# Patient Record
Sex: MALE | Race: WHITE | Employment: FULL TIME | URBAN - METROPOLITAN AREA
[De-identification: names, ages, dates, MRNs, and addresses within clinical notes are randomized per-mention and may not be internally consistent; named-entity substitution may affect disease eponyms.]

---

## 2017-07-05 DIAGNOSIS — E78.1 HYPERTRIGLYCERIDEMIA: ICD-10-CM

## 2017-07-05 RX ORDER — AMLODIPINE BESYLATE 5 MG/1
5 TABLET ORAL DAILY
Qty: 30 TABLET | Refills: 0 | Status: SHIPPED | OUTPATIENT
Start: 2017-07-05 | End: 2017-08-03

## 2017-07-05 RX ORDER — VALSARTAN 160 MG/1
TABLET ORAL
Qty: 30 TABLET | Refills: 0 | Status: SHIPPED | OUTPATIENT
Start: 2017-07-05 | End: 2017-08-03

## 2017-07-05 NOTE — TELEPHONE ENCOUNTER
Patient needs refill on the following medication. States this was not included in request from pharmacy. Current Outpatient Prescriptions:  Fenofibrate 160 MG Oral Tab Take 1 tablet (160 mg total) by mouth daily.  Disp: 30 tablet Rfl: 11     He is willi

## 2017-07-05 NOTE — TELEPHONE ENCOUNTER
LOV: 7/16/16 with Dr. Asim Lindsey: 7/8/16    Next scheduled appt:  No future apts scheduled at this time

## 2017-07-05 NOTE — TELEPHONE ENCOUNTER
Refills for 1 month sent.   Needs appointment with me for physical and labs before additional refills

## 2017-07-05 NOTE — TELEPHONE ENCOUNTER
JOSH - Kareen ortega checking status of refill. Current Outpatient Prescriptions: AmLODIPine Besylate 5 MG Oral Tab Take 1 tablet (5 mg total) by mouth daily. At bedtime Disp: 30 tablet Rfl: 11`   valsartan 160 MG Oral Tab Take one at bedtime.  Disp:

## 2017-07-06 RX ORDER — FENOFIBRATE 160 MG/1
160 TABLET ORAL DAILY
Qty: 30 TABLET | Refills: 0 | Status: SHIPPED | OUTPATIENT
Start: 2017-07-06 | End: 2017-08-03

## 2017-07-06 NOTE — TELEPHONE ENCOUNTER
Pended med for approval.  Pt was left a message to schedule an apt for further refills in the last communication

## 2017-07-17 ENCOUNTER — OFFICE VISIT (OUTPATIENT)
Dept: INTERNAL MEDICINE CLINIC | Facility: CLINIC | Age: 57
End: 2017-07-17

## 2017-07-17 VITALS
BODY MASS INDEX: 36.57 KG/M2 | SYSTOLIC BLOOD PRESSURE: 124 MMHG | HEIGHT: 72 IN | HEART RATE: 80 BPM | DIASTOLIC BLOOD PRESSURE: 72 MMHG | WEIGHT: 270 LBS

## 2017-07-17 DIAGNOSIS — E78.5 DYSLIPIDEMIA: ICD-10-CM

## 2017-07-17 DIAGNOSIS — I10 ESSENTIAL HYPERTENSION: ICD-10-CM

## 2017-07-17 DIAGNOSIS — Z00.00 ANNUAL PHYSICAL EXAM: Primary | ICD-10-CM

## 2017-07-17 DIAGNOSIS — G47.33 OBSTRUCTIVE SLEEP APNEA: ICD-10-CM

## 2017-07-17 PROBLEM — E66.9 OBESITY: Status: ACTIVE | Noted: 2017-07-17

## 2017-07-17 PROCEDURE — 90715 TDAP VACCINE 7 YRS/> IM: CPT | Performed by: INTERNAL MEDICINE

## 2017-07-17 PROCEDURE — 90471 IMMUNIZATION ADMIN: CPT | Performed by: INTERNAL MEDICINE

## 2017-07-17 PROCEDURE — 99396 PREV VISIT EST AGE 40-64: CPT | Performed by: INTERNAL MEDICINE

## 2017-07-17 NOTE — H&P
Sanjuanita Hernandez is a 49year old male who presents for a complete physical exam.   HPI:   Previous PCP was Dr. Mahogany Navarro, who retired last year. He feels well today. No specific issues for discussion.   Prior history of essential hypertension, obstructive Medical History:   Diagnosis Date   • Dyslipidemia    • Essential hypertension    • Obesity    • Obstructive sleep apnea       History reviewed. No pertinent surgical history.    Family History   Problem Relation Age of Onset   • T-cell Lymphoma [OTHER] Fat exam  Tdap vaccine today. Also recommend annual influenza vaccine. Check CMP CBC lipid profile and screening PSA when able. Order sent. Referred to GI for screening colonoscopy. Contact information provided. Continue current medications.   Recommend h

## 2017-07-18 ENCOUNTER — APPOINTMENT (OUTPATIENT)
Dept: LAB | Facility: HOSPITAL | Age: 57
End: 2017-07-18
Attending: INTERNAL MEDICINE
Payer: COMMERCIAL

## 2017-07-18 DIAGNOSIS — Z00.00 ANNUAL PHYSICAL EXAM: ICD-10-CM

## 2017-07-18 LAB
ALBUMIN SERPL BCP-MCNC: 4.7 G/DL (ref 3.5–4.8)
ALBUMIN/GLOB SERPL: 1.7 {RATIO} (ref 1–2)
ALP SERPL-CCNC: 51 U/L (ref 32–100)
ALT SERPL-CCNC: 30 U/L (ref 17–63)
ANION GAP SERPL CALC-SCNC: 12 MMOL/L (ref 0–18)
AST SERPL-CCNC: 27 U/L (ref 15–41)
BILIRUB SERPL-MCNC: 0.8 MG/DL (ref 0.3–1.2)
BUN SERPL-MCNC: 23 MG/DL (ref 8–20)
BUN/CREAT SERPL: 15.5 (ref 10–20)
CALCIUM SERPL-MCNC: 10 MG/DL (ref 8.5–10.5)
CHLORIDE SERPL-SCNC: 102 MMOL/L (ref 95–110)
CHOLEST SERPL-MCNC: 186 MG/DL (ref 110–200)
CO2 SERPL-SCNC: 25 MMOL/L (ref 22–32)
CREAT SERPL-MCNC: 1.48 MG/DL (ref 0.5–1.5)
ERYTHROCYTE [DISTWIDTH] IN BLOOD BY AUTOMATED COUNT: 14.4 % (ref 11–15)
GLOBULIN PLAS-MCNC: 2.8 G/DL (ref 2.5–3.7)
GLUCOSE SERPL-MCNC: 98 MG/DL (ref 70–99)
HCT VFR BLD AUTO: 39.2 % (ref 41–52)
HDLC SERPL-MCNC: 42 MG/DL
HGB BLD-MCNC: 13.1 G/DL (ref 13.5–17.5)
LDLC SERPL CALC-MCNC: 84 MG/DL (ref 0–99)
MCH RBC QN AUTO: 29.3 PG (ref 27–32)
MCHC RBC AUTO-ENTMCNC: 33.6 G/DL (ref 32–37)
MCV RBC AUTO: 87.5 FL (ref 80–100)
NONHDLC SERPL-MCNC: 144 MG/DL
OSMOLALITY UR CALC.SUM OF ELEC: 292 MOSM/KG (ref 275–295)
PLATELET # BLD AUTO: 253 K/UL (ref 140–400)
PMV BLD AUTO: 8.3 FL (ref 7.4–10.3)
POTASSIUM SERPL-SCNC: 4.1 MMOL/L (ref 3.3–5.1)
PROT SERPL-MCNC: 7.5 G/DL (ref 5.9–8.4)
PSA SERPL-MCNC: 0.4 NG/ML (ref 0–4)
RBC # BLD AUTO: 4.48 M/UL (ref 4.5–5.9)
SODIUM SERPL-SCNC: 139 MMOL/L (ref 136–144)
TRIGL SERPL-MCNC: 298 MG/DL (ref 1–149)
WBC # BLD AUTO: 8.2 K/UL (ref 4–11)

## 2017-07-18 PROCEDURE — 85027 COMPLETE CBC AUTOMATED: CPT

## 2017-07-18 PROCEDURE — 36415 COLL VENOUS BLD VENIPUNCTURE: CPT

## 2017-07-18 PROCEDURE — 80061 LIPID PANEL: CPT

## 2017-07-18 PROCEDURE — 80053 COMPREHEN METABOLIC PANEL: CPT

## 2017-07-19 ENCOUNTER — TELEPHONE (OUTPATIENT)
Dept: INTERNAL MEDICINE CLINIC | Facility: CLINIC | Age: 57
End: 2017-07-19

## 2017-07-19 DIAGNOSIS — E78.5 DYSLIPIDEMIA: ICD-10-CM

## 2017-07-19 DIAGNOSIS — R00.0 TACHYCARDIA: ICD-10-CM

## 2017-07-19 RX ORDER — METOPROLOL TARTRATE 50 MG/1
50 TABLET, FILM COATED ORAL NIGHTLY
Qty: 30 TABLET | Refills: 5 | Status: SHIPPED | OUTPATIENT
Start: 2017-07-19 | End: 2018-01-18

## 2017-07-19 RX ORDER — ATORVASTATIN CALCIUM 10 MG/1
10 TABLET, FILM COATED ORAL NIGHTLY
Qty: 30 TABLET | Refills: 5 | Status: SHIPPED | OUTPATIENT
Start: 2017-07-19 | End: 2018-01-18

## 2017-07-19 NOTE — TELEPHONE ENCOUNTER
[facility]     Current Outpatient Prescriptions:  Atorvastatin Calcium 10 MG Oral Tab Take 1 tablet (10 mg total) by mouth nightly. Disp: 30 tablet Rfl: 11   Metoprolol Tartrate 50 MG Oral Tab Take 1 tablet (50 mg total) by mouth nightly.  Disp: 30 tab

## 2017-08-03 DIAGNOSIS — E78.1 HYPERTRIGLYCERIDEMIA: ICD-10-CM

## 2017-08-03 RX ORDER — VALSARTAN 160 MG/1
TABLET ORAL
Qty: 30 TABLET | Refills: 5 | Status: SHIPPED | OUTPATIENT
Start: 2017-08-03 | End: 2018-01-30

## 2017-08-03 RX ORDER — FENOFIBRATE 160 MG/1
160 TABLET ORAL DAILY
Qty: 30 TABLET | Refills: 5 | Status: SHIPPED | OUTPATIENT
Start: 2017-08-03 | End: 2018-01-30

## 2017-08-03 RX ORDER — AMLODIPINE BESYLATE 5 MG/1
TABLET ORAL
Qty: 30 TABLET | Refills: 5 | Status: SHIPPED | OUTPATIENT
Start: 2017-08-03 | End: 2018-01-14

## 2017-12-19 DIAGNOSIS — E78.5 DYSLIPIDEMIA: ICD-10-CM

## 2017-12-19 DIAGNOSIS — R00.0 TACHYCARDIA: ICD-10-CM

## 2017-12-19 RX ORDER — ATORVASTATIN CALCIUM 10 MG/1
TABLET, FILM COATED ORAL
Qty: 30 TABLET | Refills: 5 | Status: CANCELLED | OUTPATIENT
Start: 2017-12-19

## 2018-01-15 RX ORDER — AMLODIPINE BESYLATE 5 MG/1
TABLET ORAL
Qty: 30 TABLET | Refills: 0 | Status: SHIPPED | OUTPATIENT
Start: 2018-01-15 | End: 2018-03-01

## 2018-01-18 DIAGNOSIS — E78.5 DYSLIPIDEMIA: ICD-10-CM

## 2018-01-18 DIAGNOSIS — R00.0 TACHYCARDIA: ICD-10-CM

## 2018-01-18 RX ORDER — METOPROLOL TARTRATE 50 MG/1
50 TABLET, FILM COATED ORAL NIGHTLY
Qty: 30 TABLET | Refills: 0 | Status: SHIPPED | OUTPATIENT
Start: 2018-01-18 | End: 2018-02-12

## 2018-01-18 RX ORDER — ATORVASTATIN CALCIUM 10 MG/1
10 TABLET, FILM COATED ORAL NIGHTLY
Qty: 90 TABLET | Refills: 0 | Status: SHIPPED | OUTPATIENT
Start: 2018-01-18 | End: 2018-03-28

## 2018-01-18 NOTE — TELEPHONE ENCOUNTER
Noted per 1/14/18 Dr Stephen Ann had stated pt needs appt and was called x1 but no call back. Called pt as unable to refill metoprolol per protocol since pt is overdue for f/u. Pt declines to schedule appt; states he will have to see when he can get in.  Informed <130   Recent Outpatient Visits            6 months ago Annual physical exam    Francisco Tai MD    Office Visit    1 year ago Hypersomnia    Alec, [facility]

## 2018-01-18 NOTE — TELEPHONE ENCOUNTER
Pt stts he has been out of medication for 4 days. atorvastatin 10 MG Oral Tab Take 1 tablet (10 mg total) by mouth nightly. Disp: 30 tablet Rfl: 5   Metoprolol Tartrate 50 MG Oral Tab Take 1 tablet (50 mg total) by mouth nightly.  Disp: 30 tablet Rfl: 5

## 2018-01-30 DIAGNOSIS — E78.1 HYPERTRIGLYCERIDEMIA: ICD-10-CM

## 2018-01-30 RX ORDER — VALSARTAN 160 MG/1
TABLET ORAL
Qty: 30 TABLET | Refills: 0 | Status: SHIPPED | OUTPATIENT
Start: 2018-01-30 | End: 2018-02-28

## 2018-01-30 RX ORDER — FENOFIBRATE 160 MG/1
160 TABLET ORAL DAILY
Qty: 30 TABLET | Refills: 0 | Status: SHIPPED | OUTPATIENT
Start: 2018-01-30 | End: 2018-02-28

## 2018-02-12 DIAGNOSIS — R00.0 TACHYCARDIA: ICD-10-CM

## 2018-02-12 RX ORDER — METOPROLOL TARTRATE 50 MG/1
50 TABLET, FILM COATED ORAL NIGHTLY
Qty: 30 TABLET | Refills: 0 | Status: SHIPPED | OUTPATIENT
Start: 2018-02-12 | End: 2018-03-16

## 2018-02-28 DIAGNOSIS — E78.1 HYPERTRIGLYCERIDEMIA: ICD-10-CM

## 2018-02-28 RX ORDER — FENOFIBRATE 160 MG/1
160 TABLET ORAL DAILY
Qty: 30 TABLET | Refills: 0 | Status: SHIPPED | OUTPATIENT
Start: 2018-02-28 | End: 2018-03-28

## 2018-02-28 RX ORDER — VALSARTAN 160 MG/1
TABLET ORAL
Qty: 30 TABLET | Refills: 0 | Status: SHIPPED | OUTPATIENT
Start: 2018-02-28 | End: 2018-03-27

## 2018-03-01 RX ORDER — AMLODIPINE BESYLATE 5 MG/1
TABLET ORAL
Qty: 30 TABLET | Refills: 0 | Status: SHIPPED | OUTPATIENT
Start: 2018-03-01 | End: 2018-03-28

## 2018-03-01 NOTE — TELEPHONE ENCOUNTER
Per pt he states he will call back to schedule an appt. He states last time he called someone told him he didn't need to come in. Not sure who but that's what pt said.

## 2018-03-16 DIAGNOSIS — R00.0 TACHYCARDIA: ICD-10-CM

## 2018-03-16 RX ORDER — METOPROLOL TARTRATE 50 MG/1
50 TABLET, FILM COATED ORAL NIGHTLY
Qty: 30 TABLET | Refills: 0 | Status: SHIPPED | OUTPATIENT
Start: 2018-03-16 | End: 2018-03-28

## 2018-03-27 RX ORDER — VALSARTAN 160 MG/1
TABLET ORAL
Qty: 30 TABLET | Refills: 0 | Status: SHIPPED | OUTPATIENT
Start: 2018-03-27 | End: 2018-03-28

## 2018-03-28 ENCOUNTER — OFFICE VISIT (OUTPATIENT)
Dept: INTERNAL MEDICINE CLINIC | Facility: CLINIC | Age: 58
End: 2018-03-28

## 2018-03-28 VITALS
HEIGHT: 72 IN | SYSTOLIC BLOOD PRESSURE: 134 MMHG | WEIGHT: 269 LBS | BODY MASS INDEX: 36.44 KG/M2 | DIASTOLIC BLOOD PRESSURE: 72 MMHG | HEART RATE: 86 BPM

## 2018-03-28 DIAGNOSIS — I10 ESSENTIAL HYPERTENSION: Primary | ICD-10-CM

## 2018-03-28 DIAGNOSIS — E78.5 DYSLIPIDEMIA: ICD-10-CM

## 2018-03-28 PROCEDURE — 99213 OFFICE O/P EST LOW 20 MIN: CPT | Performed by: INTERNAL MEDICINE

## 2018-03-28 PROCEDURE — 99212 OFFICE O/P EST SF 10 MIN: CPT | Performed by: INTERNAL MEDICINE

## 2018-03-28 RX ORDER — METOPROLOL TARTRATE 50 MG/1
50 TABLET, FILM COATED ORAL NIGHTLY
Qty: 30 TABLET | Refills: 5 | Status: SHIPPED | OUTPATIENT
Start: 2018-03-28 | End: 2018-10-02

## 2018-03-28 RX ORDER — VALSARTAN 160 MG/1
TABLET ORAL
Qty: 30 TABLET | Refills: 5 | Status: SHIPPED | OUTPATIENT
Start: 2018-03-28 | End: 2018-10-02

## 2018-03-28 RX ORDER — ATORVASTATIN CALCIUM 10 MG/1
10 TABLET, FILM COATED ORAL NIGHTLY
Qty: 30 TABLET | Refills: 5 | Status: SHIPPED | OUTPATIENT
Start: 2018-03-28 | End: 2018-10-29

## 2018-03-28 RX ORDER — AMLODIPINE BESYLATE 5 MG/1
TABLET ORAL
Qty: 30 TABLET | Refills: 5 | Status: SHIPPED | OUTPATIENT
Start: 2018-03-28 | End: 2018-10-02

## 2018-03-28 RX ORDER — FENOFIBRATE 160 MG/1
160 TABLET ORAL DAILY
Qty: 30 TABLET | Refills: 5 | Status: SHIPPED | OUTPATIENT
Start: 2018-03-28 | End: 2018-09-09

## 2018-03-28 NOTE — PATIENT INSTRUCTIONS
Continue current medications. Please obtain blood tests soon when you can. Schedule a physical in 4-6 months.

## 2018-03-28 NOTE — PROGRESS NOTES
Vanessa Kevin is a 56year old male. Patient presents with:  Hypertension: follow up ,refill medication    HPI:   Mr. Violette Cardozo presents this afternoon for follow-up of hypertension. Last visit here was for his physical July 2017.   Labs done at that time superficial subcutaneous mass left upper anterior chest wall without tenderness fluctuance or surrounding erythema, with minimal sebum expressible on palpation  CARDIAC: Rhythm regular S1 S2 normal without murmur or edema  ABDOMEN: Bowel sounds normal soft

## 2018-04-01 RX ORDER — AMLODIPINE BESYLATE 5 MG/1
TABLET ORAL
Qty: 30 TABLET | Refills: 5 | Status: SHIPPED | OUTPATIENT
Start: 2018-04-01 | End: 2018-10-29

## 2018-04-14 DIAGNOSIS — E78.5 DYSLIPIDEMIA: ICD-10-CM

## 2018-04-14 RX ORDER — ATORVASTATIN CALCIUM 10 MG/1
10 TABLET, FILM COATED ORAL NIGHTLY
Qty: 90 TABLET | Refills: 1 | Status: SHIPPED | OUTPATIENT
Start: 2018-04-14 | End: 2018-10-02

## 2018-04-16 ENCOUNTER — APPOINTMENT (OUTPATIENT)
Dept: LAB | Age: 58
End: 2018-04-16
Attending: INTERNAL MEDICINE
Payer: COMMERCIAL

## 2018-04-16 DIAGNOSIS — I10 ESSENTIAL HYPERTENSION: ICD-10-CM

## 2018-04-16 PROCEDURE — 36415 COLL VENOUS BLD VENIPUNCTURE: CPT

## 2018-04-16 PROCEDURE — 80048 BASIC METABOLIC PNL TOTAL CA: CPT

## 2018-09-09 RX ORDER — FENOFIBRATE 160 MG/1
160 TABLET ORAL DAILY
Qty: 30 TABLET | Refills: 1 | Status: SHIPPED | OUTPATIENT
Start: 2018-09-09 | End: 2018-12-03

## 2018-10-01 DIAGNOSIS — E78.5 DYSLIPIDEMIA: ICD-10-CM

## 2018-10-02 RX ORDER — AMLODIPINE BESYLATE 5 MG/1
TABLET ORAL
Qty: 90 TABLET | Refills: 0 | Status: SHIPPED | OUTPATIENT
Start: 2018-10-02 | End: 2019-04-17

## 2018-10-02 RX ORDER — ATORVASTATIN CALCIUM 10 MG/1
10 TABLET, FILM COATED ORAL NIGHTLY
Qty: 90 TABLET | Refills: 0 | Status: SHIPPED | OUTPATIENT
Start: 2018-10-02 | End: 2019-04-17

## 2018-10-02 RX ORDER — METOPROLOL TARTRATE 50 MG/1
50 TABLET, FILM COATED ORAL NIGHTLY
Qty: 90 TABLET | Refills: 0 | Status: SHIPPED | OUTPATIENT
Start: 2018-10-02 | End: 2018-12-22

## 2018-10-02 RX ORDER — VALSARTAN 160 MG/1
TABLET ORAL
Qty: 90 TABLET | Refills: 0 | Status: SHIPPED | OUTPATIENT
Start: 2018-10-02 | End: 2018-12-22

## 2018-10-29 ENCOUNTER — LAB ENCOUNTER (OUTPATIENT)
Dept: LAB | Facility: HOSPITAL | Age: 58
End: 2018-10-29
Attending: INTERNAL MEDICINE
Payer: COMMERCIAL

## 2018-10-29 ENCOUNTER — OFFICE VISIT (OUTPATIENT)
Dept: INTERNAL MEDICINE CLINIC | Facility: CLINIC | Age: 58
End: 2018-10-29
Payer: COMMERCIAL

## 2018-10-29 VITALS
BODY MASS INDEX: 37.94 KG/M2 | SYSTOLIC BLOOD PRESSURE: 132 MMHG | WEIGHT: 271 LBS | HEIGHT: 70.75 IN | DIASTOLIC BLOOD PRESSURE: 68 MMHG | HEART RATE: 94 BPM

## 2018-10-29 DIAGNOSIS — Z00.00 ANNUAL PHYSICAL EXAM: ICD-10-CM

## 2018-10-29 DIAGNOSIS — I10 ESSENTIAL HYPERTENSION: ICD-10-CM

## 2018-10-29 DIAGNOSIS — E78.5 DYSLIPIDEMIA: ICD-10-CM

## 2018-10-29 DIAGNOSIS — N18.30 CHRONIC KIDNEY DISEASE, STAGE III (MODERATE) (HCC): ICD-10-CM

## 2018-10-29 DIAGNOSIS — Z00.00 ANNUAL PHYSICAL EXAM: Primary | ICD-10-CM

## 2018-10-29 DIAGNOSIS — G47.33 OBSTRUCTIVE SLEEP APNEA: ICD-10-CM

## 2018-10-29 PROCEDURE — 85027 COMPLETE CBC AUTOMATED: CPT

## 2018-10-29 PROCEDURE — 83036 HEMOGLOBIN GLYCOSYLATED A1C: CPT

## 2018-10-29 PROCEDURE — 36415 COLL VENOUS BLD VENIPUNCTURE: CPT

## 2018-10-29 PROCEDURE — 80061 LIPID PANEL: CPT

## 2018-10-29 PROCEDURE — 80053 COMPREHEN METABOLIC PANEL: CPT

## 2018-10-29 PROCEDURE — 99396 PREV VISIT EST AGE 40-64: CPT | Performed by: INTERNAL MEDICINE

## 2018-10-29 PROCEDURE — 84443 ASSAY THYROID STIM HORMONE: CPT

## 2018-10-29 PROCEDURE — 84439 ASSAY OF FREE THYROXINE: CPT

## 2018-10-29 NOTE — H&P
Loretta Womack is a 66year old male who presents for a complete physical exam.   HPI:   His last physical was July 2017. He feels well. He recently returned from a trip to [place]. No specific issues for discussion today. [employer].    to his Father    • Diabetes Mother    • Other (Lymphoma) Brother       Social History:  Social History    Tobacco Use      Smoking status: Former Smoker        Packs/day: 1.00        Years: 30.00        Pack years: 30        Types: Cigarettes        Quit date: 7/ sent.  Recommend influenza vaccine. He declines. Discussed and recommended screening colonoscopy. He anticipates arranging with GI soon. Continue current medications.   Discussed and recommended healthy diet, regular exercise and attempts at weight loss

## 2018-10-29 NOTE — PATIENT INSTRUCTIONS
Await results of today's blood tests. Continue current medications. Please contact GI to arrange colonoscopy. You should get a flu shot every fall. Please try to eat healthy, exercise regularly and lose weight. Return visit in 6 months.

## 2018-12-03 RX ORDER — FENOFIBRATE 160 MG/1
160 TABLET ORAL DAILY
Qty: 90 TABLET | Refills: 0 | Status: SHIPPED | OUTPATIENT
Start: 2018-12-03 | End: 2019-03-05

## 2018-12-03 NOTE — TELEPHONE ENCOUNTER
Per pt, she asked her pharmacy to refill his Fenofibrate but they told pt to call his dr,  Pt is out of med. Current Outpatient Medications:                          FENOFIBRATE 160 MG Oral Tab TAKE 1 TABLET (160 MG TOTAL) BY MOUTH DAILY.  Disp: 30 tablet Rfl: 1

## 2018-12-22 RX ORDER — VALSARTAN 160 MG/1
TABLET ORAL
Qty: 90 TABLET | Refills: 0 | Status: SHIPPED | OUTPATIENT
Start: 2018-12-22 | End: 2019-03-20

## 2018-12-22 RX ORDER — METOPROLOL TARTRATE 50 MG/1
50 TABLET, FILM COATED ORAL NIGHTLY
Qty: 90 TABLET | Refills: 0 | Status: SHIPPED | OUTPATIENT
Start: 2018-12-22 | End: 2019-03-20

## 2018-12-22 NOTE — TELEPHONE ENCOUNTER
Current Outpatient Medications:  Metoprolol Tartrate 50 MG Oral Tab Take 1 tablet (50 mg total) by mouth nightly.  Disp: 90 tablet Rfl: 0   valsartan 160 MG Oral Tab TAKE 1 TABLET BY MOUTH EVERY EVENING AT BEDTIME Disp: 90 tablet Rfl: 0

## 2019-03-05 RX ORDER — FENOFIBRATE 160 MG/1
160 TABLET ORAL DAILY
Qty: 90 TABLET | Refills: 0 | Status: SHIPPED | OUTPATIENT
Start: 2019-03-05 | End: 2019-06-01

## 2019-03-05 NOTE — TELEPHONE ENCOUNTER
Pt stts refill on Rx Fenofibrate 160 MG. Pt stts out of medication. Please advise        Current Outpatient Medications:              Fenofibrate 160 MG Oral Tab Take 1 tablet (160 mg total) by mouth daily.  Disp: 90 tablet Rfl: 0

## 2019-03-06 NOTE — TELEPHONE ENCOUNTER
Refill passed per [facility], [facility] protocol.     Cholesterol Medications  Protocol Criteria:  · Appointment scheduled in the past 12 months or in the next 3 months  · ALT & LDL on file in the past 12 months  · ALT result < 80  · LDL result <130   Recent Outp

## 2019-03-20 RX ORDER — VALSARTAN 160 MG/1
TABLET ORAL
Qty: 90 TABLET | Refills: 0 | Status: SHIPPED | OUTPATIENT
Start: 2019-03-20 | End: 2019-06-10

## 2019-03-20 RX ORDER — METOPROLOL TARTRATE 50 MG/1
50 TABLET, FILM COATED ORAL NIGHTLY
Qty: 90 TABLET | Refills: 0 | Status: SHIPPED | OUTPATIENT
Start: 2019-03-20 | End: 2019-06-10

## 2019-03-20 NOTE — TELEPHONE ENCOUNTER
Mindi Kendrick from [facility] called said she tried to send the refill through but it did not go through      Current Outpatient Medications:  Metoprolol Tartrate 50 MG Oral Tab Take 1 tablet (50 mg total) by mouth nightly.  Disp: 90 tablet Rfl: 0   valsartan 160 MG Oral Tab TAKE 1 TABLET BY MOUTH EVERY EVENING AT BEDTIME Disp: 90 tablet Rfl: 0     Alpa stated Pt is out of the medication

## 2019-04-17 DIAGNOSIS — E78.5 DYSLIPIDEMIA: ICD-10-CM

## 2019-04-17 RX ORDER — ATORVASTATIN CALCIUM 10 MG/1
10 TABLET, FILM COATED ORAL NIGHTLY
Qty: 90 TABLET | Refills: 0 | Status: SHIPPED | OUTPATIENT
Start: 2019-04-17 | End: 2019-07-07

## 2019-04-17 RX ORDER — AMLODIPINE BESYLATE 5 MG/1
TABLET ORAL
Qty: 90 TABLET | Refills: 0 | Status: SHIPPED | OUTPATIENT
Start: 2019-04-17 | End: 2019-07-07

## 2019-05-04 PROBLEM — E03.8 SUBCLINICAL HYPOTHYROIDISM: Status: ACTIVE | Noted: 2018-10-01

## 2019-05-06 ENCOUNTER — APPOINTMENT (OUTPATIENT)
Dept: LAB | Facility: HOSPITAL | Age: 59
End: 2019-05-06
Attending: INTERNAL MEDICINE
Payer: COMMERCIAL

## 2019-05-06 ENCOUNTER — OFFICE VISIT (OUTPATIENT)
Dept: INTERNAL MEDICINE CLINIC | Facility: CLINIC | Age: 59
End: 2019-05-06
Payer: COMMERCIAL

## 2019-05-06 VITALS
SYSTOLIC BLOOD PRESSURE: 128 MMHG | BODY MASS INDEX: 39.21 KG/M2 | HEIGHT: 70.5 IN | DIASTOLIC BLOOD PRESSURE: 72 MMHG | WEIGHT: 277 LBS | HEART RATE: 80 BPM

## 2019-05-06 DIAGNOSIS — E03.8 SUBCLINICAL HYPOTHYROIDISM: ICD-10-CM

## 2019-05-06 DIAGNOSIS — I10 ESSENTIAL HYPERTENSION: Primary | ICD-10-CM

## 2019-05-06 PROCEDURE — 99213 OFFICE O/P EST LOW 20 MIN: CPT | Performed by: INTERNAL MEDICINE

## 2019-05-06 PROCEDURE — 99212 OFFICE O/P EST SF 10 MIN: CPT | Performed by: INTERNAL MEDICINE

## 2019-05-06 PROCEDURE — 36415 COLL VENOUS BLD VENIPUNCTURE: CPT

## 2019-05-06 PROCEDURE — 84443 ASSAY THYROID STIM HORMONE: CPT

## 2019-05-06 NOTE — PATIENT INSTRUCTIONS
Await results of today's repeat thyroid blood test.  Continue current medication. Please contact GI to arrange colonoscopy. Please schedule a physical in 6 months.

## 2019-05-06 NOTE — PROGRESS NOTES
Kim Velazco is a 66year old male. Patient presents with:  Hypertension    HPI:   Mr. Alin Gifford presents this evening for six-month follow-up of hypertension. Lately he has been feeling well, except for URI symptoms which began yesterday.   No out of S1 S2 normal without murmur   ABDOMEN: Bowel sounds normal soft nontender        ASSESSMENT AND PLAN:   1. Essential hypertension  Well-controlled. Continue current medication. Check TSH with reflex T4 today. Order sent.   Contact information for GI repr

## 2019-05-22 RX ORDER — METOPROLOL TARTRATE 50 MG/1
TABLET, FILM COATED ORAL
Qty: 30 TABLET | Refills: 5 | OUTPATIENT
Start: 2019-05-22

## 2019-06-01 RX ORDER — FENOFIBRATE 160 MG/1
TABLET ORAL
Qty: 90 TABLET | Refills: 1 | Status: SHIPPED | OUTPATIENT
Start: 2019-06-01 | End: 2019-09-21

## 2019-06-10 RX ORDER — VALSARTAN 160 MG/1
TABLET ORAL
Qty: 90 TABLET | Refills: 1 | Status: SHIPPED | OUTPATIENT
Start: 2019-06-10 | End: 2019-12-08

## 2019-06-10 RX ORDER — METOPROLOL TARTRATE 50 MG/1
TABLET, FILM COATED ORAL
Qty: 90 TABLET | Refills: 1 | Status: SHIPPED | OUTPATIENT
Start: 2019-06-10 | End: 2019-09-30

## 2019-07-07 DIAGNOSIS — E78.5 DYSLIPIDEMIA: ICD-10-CM

## 2019-07-08 RX ORDER — ATORVASTATIN CALCIUM 10 MG/1
10 TABLET, FILM COATED ORAL NIGHTLY
Qty: 90 TABLET | Refills: 1 | Status: SHIPPED | OUTPATIENT
Start: 2019-07-08 | End: 2019-12-22

## 2019-07-08 RX ORDER — AMLODIPINE BESYLATE 5 MG/1
TABLET ORAL
Qty: 90 TABLET | Refills: 1 | Status: SHIPPED | OUTPATIENT
Start: 2019-07-08 | End: 2019-12-22

## 2019-07-09 NOTE — TELEPHONE ENCOUNTER
Refill passed per [facility], [facility] protocol.   Cholesterol Medications  Protocol Criteria:  · Appointment scheduled in the past 12 months or in the next 3 months  · ALT & LDL on file in the past 12 months  · ALT result < 80  · LDL result <130   Recent Outpat 10/29/2018    GFRNAA 59 (L) 10/29/2018    GFRAA >60 10/29/2018    CA 10.5 10/29/2018    ALKPHOS 68 07/11/2016    AST 37 10/29/2018    ALT 39 10/29/2018    BILT 0.7 10/29/2018    TP 7.8 10/29/2018    ALB 4.5 10/29/2018     10/29/2018    K 3.8 10/29/20

## 2019-09-21 RX ORDER — FENOFIBRATE 160 MG/1
TABLET ORAL
Qty: 90 TABLET | Refills: 1 | Status: SHIPPED | OUTPATIENT
Start: 2019-09-21 | End: 2020-03-13

## 2019-09-22 NOTE — TELEPHONE ENCOUNTER
Refill passed per [facility] protocol.     Cholesterol Medications  Protocol Criteria:  · Appointment scheduled in the past 12 months or in the next 3 months  · ALT & LDL on file in the past 12 months  · ALT result < 80  · LDL result <130   Recent Outp

## 2019-09-30 RX ORDER — METOPROLOL TARTRATE 50 MG/1
TABLET, FILM COATED ORAL
Qty: 90 TABLET | Refills: 1 | Status: SHIPPED | OUTPATIENT
Start: 2019-09-30 | End: 2020-04-27

## 2019-12-09 RX ORDER — VALSARTAN 160 MG/1
TABLET ORAL
Qty: 30 TABLET | Refills: 0 | Status: SHIPPED | OUTPATIENT
Start: 2019-12-09 | End: 2019-12-26

## 2019-12-21 DIAGNOSIS — E78.5 DYSLIPIDEMIA: ICD-10-CM

## 2019-12-22 RX ORDER — AMLODIPINE BESYLATE 5 MG/1
TABLET ORAL
Qty: 90 TABLET | Refills: 0 | Status: SHIPPED | OUTPATIENT
Start: 2019-12-22 | End: 2020-03-20

## 2019-12-22 RX ORDER — ATORVASTATIN CALCIUM 10 MG/1
TABLET, FILM COATED ORAL
Qty: 90 TABLET | Refills: 0 | Status: SHIPPED | OUTPATIENT
Start: 2019-12-22 | End: 2020-03-13

## 2019-12-26 RX ORDER — VALSARTAN 160 MG/1
TABLET ORAL
Qty: 30 TABLET | Refills: 0 | Status: SHIPPED | OUTPATIENT
Start: 2019-12-26 | End: 2020-01-26

## 2019-12-26 NOTE — TELEPHONE ENCOUNTER
Refilled failed. Please see 12/9/19 and 12/21/19 encounters. The patient needs labs and an appointment. Left message to call back. Dr. Dusty Tijerina please advise.     Hypertensive Medications  Protocol Criteria:  · Appointment scheduled in the past 6 mo

## 2020-01-22 NOTE — TELEPHONE ENCOUNTER
No upcoming appointments found. Pt does not have MyChart. Call center, please make 2 phone attempts. If you speak with pt or able to leave a detailed message regarding need to follow up, you can close encounter.

## 2020-01-26 NOTE — TELEPHONE ENCOUNTER
ARIAS please assist with scheduling 6 month f/u appt, pt does not have mychart, thanks    Please review; protocol failed.  D/t labs and appt  Requested Prescriptions     Pending Prescriptions Disp Refills   • VALSARTAN 160 MG Oral Tab [Pharmacy Med Name: BECKI

## 2020-01-27 RX ORDER — VALSARTAN 160 MG/1
TABLET ORAL
Qty: 90 TABLET | Refills: 0 | Status: SHIPPED | OUTPATIENT
Start: 2020-01-27 | End: 2020-05-18

## 2020-01-27 NOTE — TELEPHONE ENCOUNTER
Andria Anthony MD 1 hour ago (9:19 AM)         Refill sent.  Needs appointment for physical and labs        Patient does not active mychart

## 2020-03-13 DIAGNOSIS — E78.5 DYSLIPIDEMIA: ICD-10-CM

## 2020-03-13 RX ORDER — FENOFIBRATE 160 MG/1
TABLET ORAL
Qty: 30 TABLET | Refills: 0 | Status: SHIPPED | OUTPATIENT
Start: 2020-03-13 | End: 2020-04-09

## 2020-03-13 RX ORDER — ATORVASTATIN CALCIUM 10 MG/1
TABLET, FILM COATED ORAL
Qty: 30 TABLET | Refills: 0 | Status: SHIPPED | OUTPATIENT
Start: 2020-03-13 | End: 2020-04-09

## 2020-03-13 NOTE — TELEPHONE ENCOUNTER
Please review; protocol failed.     Requested Prescriptions     Pending Prescriptions Disp Refills   • ATORVASTATIN 10 MG Oral Tab [Pharmacy Med Name: ATORVASTATIN 10 MG TABLET] 30 tablet 2     Sig: TAKE 1 TABLET BY MOUTH EVERY DAY EVERY NIGHT   • [facility]

## 2020-03-13 NOTE — TELEPHONE ENCOUNTER
Pharmacy is requesting a refill on the medications listed below and AMLODIPINE BESYLATE 5 MG Oral Tab

## 2020-03-20 RX ORDER — AMLODIPINE BESYLATE 5 MG/1
TABLET ORAL
Qty: 90 TABLET | Refills: 0 | Status: SHIPPED | OUTPATIENT
Start: 2020-03-20 | End: 2020-06-19

## 2020-03-20 NOTE — TELEPHONE ENCOUNTER
Called [facility], spoke with Marely Arellano    Patient needs refill for Amlodipine \  \Med pended for your review / approval    Routing to Merlyn Soriano as Dr. Cheo Hartley is out of office and patient has no meds

## 2020-04-09 DIAGNOSIS — E78.5 DYSLIPIDEMIA: ICD-10-CM

## 2020-04-09 RX ORDER — ATORVASTATIN CALCIUM 10 MG/1
TABLET, FILM COATED ORAL
Qty: 30 TABLET | Refills: 0 | Status: SHIPPED | OUTPATIENT
Start: 2020-04-09 | End: 2020-05-10

## 2020-04-09 RX ORDER — FENOFIBRATE 160 MG/1
TABLET ORAL
Qty: 30 TABLET | Refills: 0 | Status: SHIPPED | OUTPATIENT
Start: 2020-04-09 | End: 2020-05-07

## 2020-04-27 RX ORDER — METOPROLOL TARTRATE 50 MG/1
TABLET, FILM COATED ORAL
Qty: 30 TABLET | Refills: 1 | Status: SHIPPED | OUTPATIENT
Start: 2020-04-27 | End: 2020-06-19

## 2020-05-07 RX ORDER — FENOFIBRATE 160 MG/1
TABLET ORAL
Qty: 30 TABLET | Refills: 1 | Status: SHIPPED | OUTPATIENT
Start: 2020-05-07 | End: 2020-07-07

## 2020-05-10 DIAGNOSIS — E78.5 DYSLIPIDEMIA: ICD-10-CM

## 2020-05-10 RX ORDER — ATORVASTATIN CALCIUM 10 MG/1
TABLET, FILM COATED ORAL
Qty: 30 TABLET | Refills: 1 | Status: SHIPPED | OUTPATIENT
Start: 2020-05-10 | End: 2020-07-07

## 2020-05-18 RX ORDER — VALSARTAN 160 MG/1
TABLET ORAL
Qty: 30 TABLET | Refills: 1 | Status: SHIPPED | OUTPATIENT
Start: 2020-05-18 | End: 2020-07-16

## 2020-06-19 RX ORDER — AMLODIPINE BESYLATE 5 MG/1
TABLET ORAL
Qty: 30 TABLET | Refills: 0 | Status: SHIPPED | OUTPATIENT
Start: 2020-06-19 | End: 2020-07-15

## 2020-06-19 RX ORDER — METOPROLOL TARTRATE 50 MG/1
TABLET, FILM COATED ORAL
Qty: 30 TABLET | Refills: 0 | Status: SHIPPED | OUTPATIENT
Start: 2020-06-19 | End: 2020-07-15

## 2020-06-29 ENCOUNTER — OFFICE VISIT (OUTPATIENT)
Dept: INTERNAL MEDICINE CLINIC | Facility: CLINIC | Age: 60
End: 2020-06-29
Payer: COMMERCIAL

## 2020-06-29 ENCOUNTER — LAB ENCOUNTER (OUTPATIENT)
Dept: LAB | Facility: HOSPITAL | Age: 60
End: 2020-06-29
Attending: INTERNAL MEDICINE
Payer: COMMERCIAL

## 2020-06-29 VITALS
BODY MASS INDEX: 39.28 KG/M2 | HEART RATE: 97 BPM | WEIGHT: 290 LBS | SYSTOLIC BLOOD PRESSURE: 138 MMHG | HEIGHT: 72 IN | DIASTOLIC BLOOD PRESSURE: 82 MMHG

## 2020-06-29 DIAGNOSIS — N18.30 CHRONIC KIDNEY DISEASE, STAGE III (MODERATE) (HCC): ICD-10-CM

## 2020-06-29 DIAGNOSIS — E03.8 SUBCLINICAL HYPOTHYROIDISM: ICD-10-CM

## 2020-06-29 DIAGNOSIS — Z00.00 ANNUAL PHYSICAL EXAM: Primary | ICD-10-CM

## 2020-06-29 DIAGNOSIS — G47.33 OBSTRUCTIVE SLEEP APNEA: ICD-10-CM

## 2020-06-29 DIAGNOSIS — Z00.00 ANNUAL PHYSICAL EXAM: ICD-10-CM

## 2020-06-29 DIAGNOSIS — I10 ESSENTIAL HYPERTENSION: ICD-10-CM

## 2020-06-29 DIAGNOSIS — E78.5 DYSLIPIDEMIA: ICD-10-CM

## 2020-06-29 LAB
ALBUMIN SERPL-MCNC: 4.4 G/DL (ref 3.4–5)
ALBUMIN/GLOB SERPL: 1.1 {RATIO} (ref 1–2)
ALP LIVER SERPL-CCNC: 61 U/L (ref 45–117)
ALT SERPL-CCNC: 52 U/L (ref 16–61)
ANION GAP SERPL CALC-SCNC: 5 MMOL/L (ref 0–18)
AST SERPL-CCNC: 30 U/L (ref 15–37)
BILIRUB SERPL-MCNC: 0.4 MG/DL (ref 0.1–2)
BUN BLD-MCNC: 26 MG/DL (ref 7–18)
BUN/CREAT SERPL: 19.5 (ref 10–20)
CALCIUM BLD-MCNC: 9.9 MG/DL (ref 8.5–10.1)
CHLORIDE SERPL-SCNC: 106 MMOL/L (ref 98–112)
CHOLEST SMN-MCNC: 210 MG/DL (ref ?–200)
CO2 SERPL-SCNC: 30 MMOL/L (ref 21–32)
COMPLEXED PSA SERPL-MCNC: 0.6 NG/ML (ref ?–4)
CREAT BLD-MCNC: 1.33 MG/DL (ref 0.7–1.3)
DEPRECATED RDW RBC AUTO: 47 FL (ref 35.1–46.3)
ERYTHROCYTE [DISTWIDTH] IN BLOOD BY AUTOMATED COUNT: 14.6 % (ref 11–15)
EST. AVERAGE GLUCOSE BLD GHB EST-MCNC: 131 MG/DL (ref 68–126)
GLOBULIN PLAS-MCNC: 3.9 G/DL (ref 2.8–4.4)
GLUCOSE BLD-MCNC: 87 MG/DL (ref 70–99)
HBA1C MFR BLD HPLC: 6.2 % (ref ?–5.7)
HCT VFR BLD AUTO: 41.2 % (ref 39–53)
HDLC SERPL-MCNC: 51 MG/DL (ref 40–59)
HGB BLD-MCNC: 13.5 G/DL (ref 13–17.5)
LDLC SERPL CALC-MCNC: 105 MG/DL (ref ?–100)
M PROTEIN MFR SERPL ELPH: 8.3 G/DL (ref 6.4–8.2)
MCH RBC QN AUTO: 28.9 PG (ref 26–34)
MCHC RBC AUTO-ENTMCNC: 32.8 G/DL (ref 31–37)
MCV RBC AUTO: 88.2 FL (ref 80–100)
NONHDLC SERPL-MCNC: 159 MG/DL (ref ?–130)
OSMOLALITY SERPL CALC.SUM OF ELEC: 296 MOSM/KG (ref 275–295)
PATIENT FASTING Y/N/NP: YES
PATIENT FASTING Y/N/NP: YES
PLATELET # BLD AUTO: 259 10(3)UL (ref 150–450)
POTASSIUM SERPL-SCNC: 4.2 MMOL/L (ref 3.5–5.1)
RBC # BLD AUTO: 4.67 X10(6)UL (ref 4.3–5.7)
SODIUM SERPL-SCNC: 141 MMOL/L (ref 136–145)
T4 FREE SERPL-MCNC: 0.9 NG/DL (ref 0.8–1.7)
TRIGL SERPL-MCNC: 271 MG/DL (ref 30–149)
TSI SER-ACNC: 4.22 MIU/ML (ref 0.36–3.74)
VLDLC SERPL CALC-MCNC: 54 MG/DL (ref 0–30)
WBC # BLD AUTO: 8.4 X10(3) UL (ref 4–11)

## 2020-06-29 PROCEDURE — 80061 LIPID PANEL: CPT

## 2020-06-29 PROCEDURE — 82306 VITAMIN D 25 HYDROXY: CPT

## 2020-06-29 PROCEDURE — 84439 ASSAY OF FREE THYROXINE: CPT

## 2020-06-29 PROCEDURE — 36415 COLL VENOUS BLD VENIPUNCTURE: CPT

## 2020-06-29 PROCEDURE — 99396 PREV VISIT EST AGE 40-64: CPT | Performed by: INTERNAL MEDICINE

## 2020-06-29 PROCEDURE — 83036 HEMOGLOBIN GLYCOSYLATED A1C: CPT

## 2020-06-29 PROCEDURE — 84443 ASSAY THYROID STIM HORMONE: CPT

## 2020-06-29 PROCEDURE — 85027 COMPLETE CBC AUTOMATED: CPT

## 2020-06-29 PROCEDURE — 80053 COMPREHEN METABOLIC PANEL: CPT

## 2020-06-29 RX ORDER — MULTIVIT-MIN/IRON FUM/FOLIC AC 7.5 MG-4
1 TABLET ORAL DAILY
COMMUNITY

## 2020-06-29 NOTE — H&P
Shantel Lyons is a 64year old male who presents for a complete physical exam, as well as for follow-up of hypertension and hypercholesterolemia. HPI:   His last physical was October 2018, and his last visit here was May 2019.   He has been feeling well (moderate) ([facility] [address]    • Dyslipidemia    • Essential hypertension    • Obesity    • Obstructive sleep apnea     On CPAP   • Subclinical hypothyroidism 10/2018      History reviewed. No pertinent surgical history.    Family History   Problem Relation Age of Ons profile screening PSA TSH with reflex T4 and vitamin D level today. Orders sent. Agree with plans to arrange colonoscopy soon. Recommend annual influenza vaccine. Continue current medications.   Discussed and recommended healthy diet, regular exercise a

## 2020-06-29 NOTE — PATIENT INSTRUCTIONS
Await results of today's blood tests. Continue current medication. Please contact GI to arrange colonoscopy. I would recommend a flu shot this fall. Please try to follow a healthy diet, exercise regularly and lose weight. Return visit in 6 months.

## 2020-07-01 LAB — 25(OH)D3 SERPL-MCNC: 24.7 NG/ML (ref 30–100)

## 2020-07-07 DIAGNOSIS — E78.5 DYSLIPIDEMIA: ICD-10-CM

## 2020-07-07 RX ORDER — FENOFIBRATE 160 MG/1
TABLET ORAL
Qty: 30 TABLET | Refills: 11 | Status: SHIPPED | OUTPATIENT
Start: 2020-07-07 | End: 2021-06-27

## 2020-07-07 RX ORDER — ATORVASTATIN CALCIUM 10 MG/1
TABLET, FILM COATED ORAL
Qty: 30 TABLET | Refills: 11 | Status: SHIPPED | OUTPATIENT
Start: 2020-07-07 | End: 2021-06-27

## 2020-07-15 RX ORDER — AMLODIPINE BESYLATE 5 MG/1
TABLET ORAL
Qty: 30 TABLET | Refills: 5 | Status: SHIPPED | OUTPATIENT
Start: 2020-07-15 | End: 2021-01-04

## 2020-07-15 RX ORDER — METOPROLOL TARTRATE 50 MG/1
TABLET, FILM COATED ORAL
Qty: 30 TABLET | Refills: 5 | Status: SHIPPED | OUTPATIENT
Start: 2020-07-15 | End: 2021-01-04

## 2020-07-16 RX ORDER — VALSARTAN 160 MG/1
TABLET ORAL
Qty: 30 TABLET | Refills: 5 | Status: SHIPPED | OUTPATIENT
Start: 2020-07-16 | End: 2021-01-04

## 2021-01-04 RX ORDER — VALSARTAN 160 MG/1
TABLET ORAL
Qty: 30 TABLET | Refills: 0 | Status: SHIPPED | OUTPATIENT
Start: 2021-01-04 | End: 2021-02-22

## 2021-01-04 RX ORDER — METOPROLOL TARTRATE 50 MG/1
50 TABLET, FILM COATED ORAL NIGHTLY
Qty: 30 TABLET | Refills: 0 | Status: SHIPPED | OUTPATIENT
Start: 2021-01-04 | End: 2021-01-29

## 2021-01-04 RX ORDER — AMLODIPINE BESYLATE 5 MG/1
TABLET ORAL
Qty: 30 TABLET | Refills: 0 | Status: SHIPPED | OUTPATIENT
Start: 2021-01-04 | End: 2021-02-22

## 2021-01-07 NOTE — TELEPHONE ENCOUNTER
Spoke to Patient. (Connection was bad).  He advised he will need to look at his calendar and call us back

## 2021-01-29 RX ORDER — METOPROLOL TARTRATE 50 MG/1
TABLET, FILM COATED ORAL
Qty: 30 TABLET | Refills: 0 | Status: SHIPPED | OUTPATIENT
Start: 2021-01-29 | End: 2021-03-01

## 2021-02-22 RX ORDER — VALSARTAN 160 MG/1
TABLET ORAL
Qty: 30 TABLET | Refills: 0 | Status: SHIPPED | OUTPATIENT
Start: 2021-02-22 | End: 2021-03-01

## 2021-02-22 RX ORDER — AMLODIPINE BESYLATE 5 MG/1
TABLET ORAL
Qty: 30 TABLET | Refills: 0 | Status: SHIPPED | OUTPATIENT
Start: 2021-02-22 | End: 2021-03-01

## 2021-03-01 ENCOUNTER — OFFICE VISIT (OUTPATIENT)
Dept: INTERNAL MEDICINE CLINIC | Facility: CLINIC | Age: 61
End: 2021-03-01
Payer: COMMERCIAL

## 2021-03-01 VITALS
HEART RATE: 76 BPM | SYSTOLIC BLOOD PRESSURE: 136 MMHG | RESPIRATION RATE: 18 BRPM | DIASTOLIC BLOOD PRESSURE: 76 MMHG | WEIGHT: 301.19 LBS | BODY MASS INDEX: 40.8 KG/M2 | HEIGHT: 72 IN

## 2021-03-01 DIAGNOSIS — E78.5 DYSLIPIDEMIA: ICD-10-CM

## 2021-03-01 DIAGNOSIS — I10 ESSENTIAL HYPERTENSION: Primary | ICD-10-CM

## 2021-03-01 PROCEDURE — 3078F DIAST BP <80 MM HG: CPT | Performed by: INTERNAL MEDICINE

## 2021-03-01 PROCEDURE — 99213 OFFICE O/P EST LOW 20 MIN: CPT | Performed by: INTERNAL MEDICINE

## 2021-03-01 PROCEDURE — 3008F BODY MASS INDEX DOCD: CPT | Performed by: INTERNAL MEDICINE

## 2021-03-01 PROCEDURE — 3075F SYST BP GE 130 - 139MM HG: CPT | Performed by: INTERNAL MEDICINE

## 2021-03-01 RX ORDER — METOPROLOL TARTRATE 50 MG/1
50 TABLET, FILM COATED ORAL NIGHTLY
Qty: 90 TABLET | Refills: 1 | Status: SHIPPED | OUTPATIENT
Start: 2021-03-01 | End: 2021-08-23

## 2021-03-01 RX ORDER — VALSARTAN 160 MG/1
160 TABLET ORAL NIGHTLY
Qty: 90 TABLET | Refills: 1 | Status: SHIPPED | OUTPATIENT
Start: 2021-03-01 | End: 2021-08-23

## 2021-03-01 RX ORDER — AMLODIPINE BESYLATE 5 MG/1
5 TABLET ORAL DAILY
Qty: 90 TABLET | Refills: 1 | Status: SHIPPED | OUTPATIENT
Start: 2021-03-01 | End: 2021-06-28

## 2021-03-02 NOTE — PROGRESS NOTES
Lobo Strong is a 64year old male. Patient presents with:  HTN: medication f/u- needs medication refilled    HPI:   Chiki Girard presents this evening for 6-month follow-up of hypertension. Compliant with medications as listed below.   BP checks typically Resp 18   Ht 6' (1.829 m)   Wt (!) 301 lb 3.2 oz (136.6 kg)   BMI 40.85 kg/m²   LUNGS: Resonant to percussion and clear to auscultation  CARDIAC: Rhythm regular S1 S2 normal without murmur or edema  ABDOMEN: Obese.   Bowel sounds normal soft nontender with

## 2021-03-02 NOTE — PATIENT INSTRUCTIONS
Your blood pressure today was well controlled at 136/76. Continue your current medication. Please try to eat healthy, exercise regularly and lose weight. Please schedule a physical in June or July.

## 2021-06-27 DIAGNOSIS — E78.5 DYSLIPIDEMIA: ICD-10-CM

## 2021-06-27 RX ORDER — FENOFIBRATE 160 MG/1
160 TABLET ORAL DAILY
Qty: 30 TABLET | Refills: 5 | Status: SHIPPED | OUTPATIENT
Start: 2021-06-27 | End: 2021-12-11

## 2021-06-27 RX ORDER — ATORVASTATIN CALCIUM 10 MG/1
10 TABLET, FILM COATED ORAL NIGHTLY
Qty: 30 TABLET | Refills: 5 | Status: SHIPPED | OUTPATIENT
Start: 2021-06-27 | End: 2021-12-11

## 2021-06-28 RX ORDER — AMLODIPINE BESYLATE 5 MG/1
5 TABLET ORAL DAILY
Qty: 90 TABLET | Refills: 0 | Status: SHIPPED | OUTPATIENT
Start: 2021-06-28 | End: 2021-09-17

## 2021-08-23 RX ORDER — VALSARTAN 160 MG/1
160 TABLET ORAL NIGHTLY
Qty: 90 TABLET | Refills: 0 | Status: SHIPPED | OUTPATIENT
Start: 2021-08-23 | End: 2021-11-15

## 2021-08-23 RX ORDER — METOPROLOL TARTRATE 50 MG/1
50 TABLET, FILM COATED ORAL NIGHTLY
Qty: 90 TABLET | Refills: 0 | Status: SHIPPED | OUTPATIENT
Start: 2021-08-23 | End: 2021-11-15

## 2021-09-17 RX ORDER — AMLODIPINE BESYLATE 5 MG/1
5 TABLET ORAL DAILY
Qty: 30 TABLET | Refills: 1 | Status: SHIPPED | OUTPATIENT
Start: 2021-09-17 | End: 2021-11-15

## 2021-11-15 RX ORDER — AMLODIPINE BESYLATE 5 MG/1
TABLET ORAL
Qty: 30 TABLET | Refills: 1 | Status: SHIPPED | OUTPATIENT
Start: 2021-11-15 | End: 2022-01-16

## 2021-11-15 RX ORDER — VALSARTAN 160 MG/1
160 TABLET ORAL NIGHTLY
Qty: 30 TABLET | Refills: 1 | Status: SHIPPED | OUTPATIENT
Start: 2021-11-15 | End: 2022-01-16

## 2021-11-15 RX ORDER — METOPROLOL TARTRATE 50 MG/1
50 TABLET, FILM COATED ORAL NIGHTLY
Qty: 30 TABLET | Refills: 1 | Status: SHIPPED | OUTPATIENT
Start: 2021-11-15 | End: 2022-01-16

## 2021-12-11 DIAGNOSIS — E78.5 DYSLIPIDEMIA: ICD-10-CM

## 2021-12-11 RX ORDER — FENOFIBRATE 160 MG/1
TABLET ORAL
Qty: 30 TABLET | Refills: 5 | Status: SHIPPED | OUTPATIENT
Start: 2021-12-11

## 2021-12-11 RX ORDER — ATORVASTATIN CALCIUM 10 MG/1
TABLET, FILM COATED ORAL
Qty: 30 TABLET | Refills: 5 | Status: SHIPPED | OUTPATIENT
Start: 2021-12-11

## 2021-12-13 ENCOUNTER — OFFICE VISIT (OUTPATIENT)
Dept: INTERNAL MEDICINE CLINIC | Facility: CLINIC | Age: 61
End: 2021-12-13
Payer: COMMERCIAL

## 2021-12-13 VITALS
SYSTOLIC BLOOD PRESSURE: 132 MMHG | BODY MASS INDEX: 40.12 KG/M2 | HEIGHT: 72 IN | HEART RATE: 92 BPM | DIASTOLIC BLOOD PRESSURE: 68 MMHG | WEIGHT: 296.19 LBS

## 2021-12-13 DIAGNOSIS — E78.5 DYSLIPIDEMIA: ICD-10-CM

## 2021-12-13 DIAGNOSIS — E03.8 SUBCLINICAL HYPOTHYROIDISM: ICD-10-CM

## 2021-12-13 DIAGNOSIS — N18.30 STAGE 3 CHRONIC KIDNEY DISEASE, UNSPECIFIED WHETHER STAGE 3A OR 3B CKD (HCC): ICD-10-CM

## 2021-12-13 DIAGNOSIS — G47.33 OBSTRUCTIVE SLEEP APNEA: ICD-10-CM

## 2021-12-13 DIAGNOSIS — I10 ESSENTIAL HYPERTENSION: ICD-10-CM

## 2021-12-13 DIAGNOSIS — E66.01 MORBID OBESITY (HCC): ICD-10-CM

## 2021-12-13 DIAGNOSIS — R73.03 PREDIABETES: ICD-10-CM

## 2021-12-13 DIAGNOSIS — Z00.00 ANNUAL PHYSICAL EXAM: Primary | ICD-10-CM

## 2021-12-13 PROCEDURE — 3075F SYST BP GE 130 - 139MM HG: CPT | Performed by: INTERNAL MEDICINE

## 2021-12-13 PROCEDURE — 3008F BODY MASS INDEX DOCD: CPT | Performed by: INTERNAL MEDICINE

## 2021-12-13 PROCEDURE — 3078F DIAST BP <80 MM HG: CPT | Performed by: INTERNAL MEDICINE

## 2021-12-13 PROCEDURE — 99396 PREV VISIT EST AGE 40-64: CPT | Performed by: INTERNAL MEDICINE

## 2021-12-13 NOTE — H&P
Fouzia Melo is a 63year old male who presents for a complete physical exam, as well as for follow-up of hypertension and dyslipidemia. HPI:   His last physical was June 2020. In general he feels well. No specific issues for discussion today.   Micky Prediabetes    • Subclinical hypothyroidism 10/2018      History reviewed. No pertinent surgical history.    Family History   Problem Relation Age of Onset   • Other (T-cell Lymphoma) Father    • Diabetes Mother    • Other (Lymphoma) Brother       Social Hi CMP CBC glycohemoglobin lipid profile TSH with reflex T4 and screening PSA when able. Order sent. Discussed and recommended screening colonoscopy. He will contact GI to arrange. Continue current medications.   Discussed and recommended healthy diet, reg

## 2021-12-14 NOTE — PATIENT INSTRUCTIONS
Your blood pressure today was good at 132/68. Please come in soon for blood tests when you can. Continue current medications. Please try to eat healthy, exercise regularly and lose some weight. Return visit in 6 months for a blood pressure check.

## 2021-12-22 ENCOUNTER — LAB ENCOUNTER (OUTPATIENT)
Dept: LAB | Age: 61
End: 2021-12-22
Attending: INTERNAL MEDICINE
Payer: COMMERCIAL

## 2021-12-22 DIAGNOSIS — Z00.00 ANNUAL PHYSICAL EXAM: ICD-10-CM

## 2021-12-22 PROCEDURE — 84443 ASSAY THYROID STIM HORMONE: CPT

## 2021-12-22 PROCEDURE — 85027 COMPLETE CBC AUTOMATED: CPT

## 2021-12-22 PROCEDURE — 36415 COLL VENOUS BLD VENIPUNCTURE: CPT

## 2021-12-22 PROCEDURE — 80053 COMPREHEN METABOLIC PANEL: CPT

## 2021-12-22 PROCEDURE — 83036 HEMOGLOBIN GLYCOSYLATED A1C: CPT

## 2021-12-22 PROCEDURE — 80061 LIPID PANEL: CPT

## 2022-01-16 RX ORDER — AMLODIPINE BESYLATE 5 MG/1
5 TABLET ORAL DAILY
Qty: 30 TABLET | Refills: 5 | Status: SHIPPED | OUTPATIENT
Start: 2022-01-16

## 2022-01-16 RX ORDER — VALSARTAN 160 MG/1
160 TABLET ORAL NIGHTLY
Qty: 30 TABLET | Refills: 5 | Status: SHIPPED | OUTPATIENT
Start: 2022-01-16

## 2022-01-16 RX ORDER — METOPROLOL TARTRATE 50 MG/1
50 TABLET, FILM COATED ORAL NIGHTLY
Qty: 30 TABLET | Refills: 5 | Status: SHIPPED | OUTPATIENT
Start: 2022-01-16

## 2022-06-27 DIAGNOSIS — E78.5 DYSLIPIDEMIA: ICD-10-CM

## 2022-06-27 RX ORDER — ATORVASTATIN CALCIUM 10 MG/1
TABLET, FILM COATED ORAL
Qty: 30 TABLET | Refills: 5 | Status: SHIPPED | OUTPATIENT
Start: 2022-06-27 | End: 2023-01-03

## 2022-06-27 RX ORDER — FENOFIBRATE 160 MG/1
TABLET ORAL
Qty: 30 TABLET | Refills: 5 | Status: SHIPPED | OUTPATIENT
Start: 2022-06-27 | End: 2023-01-03

## 2022-06-27 NOTE — TELEPHONE ENCOUNTER
1st attempt - [employer] message sent for patient to contact the office to schedule an appointment; see notes below.

## 2022-08-11 RX ORDER — METOPROLOL TARTRATE 50 MG/1
50 TABLET, FILM COATED ORAL NIGHTLY
Qty: 30 TABLET | Refills: 1 | Status: SHIPPED | OUTPATIENT
Start: 2022-08-11 | End: 2022-10-07

## 2022-08-11 RX ORDER — VALSARTAN 160 MG/1
160 TABLET ORAL NIGHTLY
Qty: 30 TABLET | Refills: 1 | Status: SHIPPED | OUTPATIENT
Start: 2022-08-11 | End: 2022-10-07

## 2022-08-11 RX ORDER — AMLODIPINE BESYLATE 5 MG/1
5 TABLET ORAL DAILY
Qty: 30 TABLET | Refills: 1 | Status: SHIPPED | OUTPATIENT
Start: 2022-08-11 | End: 2022-10-07

## 2022-08-22 ENCOUNTER — OFFICE VISIT (OUTPATIENT)
Dept: INTERNAL MEDICINE CLINIC | Facility: CLINIC | Age: 62
End: 2022-08-22
Payer: COMMERCIAL

## 2022-08-22 VITALS
HEART RATE: 86 BPM | WEIGHT: 300.31 LBS | HEIGHT: 72 IN | BODY MASS INDEX: 40.68 KG/M2 | DIASTOLIC BLOOD PRESSURE: 68 MMHG | SYSTOLIC BLOOD PRESSURE: 130 MMHG

## 2022-08-22 DIAGNOSIS — I10 ESSENTIAL HYPERTENSION: Primary | ICD-10-CM

## 2022-08-22 PROCEDURE — 3075F SYST BP GE 130 - 139MM HG: CPT | Performed by: INTERNAL MEDICINE

## 2022-08-22 PROCEDURE — 3008F BODY MASS INDEX DOCD: CPT | Performed by: INTERNAL MEDICINE

## 2022-08-22 PROCEDURE — 3078F DIAST BP <80 MM HG: CPT | Performed by: INTERNAL MEDICINE

## 2022-08-22 PROCEDURE — 99213 OFFICE O/P EST LOW 20 MIN: CPT | Performed by: INTERNAL MEDICINE

## 2022-08-22 NOTE — PATIENT INSTRUCTIONS
Your blood pressure today was good at 130/68. Please continue current medications. Please contact GI to arrange colonoscopy. Please schedule a physical in December.

## 2022-10-07 RX ORDER — AMLODIPINE BESYLATE 5 MG/1
5 TABLET ORAL DAILY
Qty: 90 TABLET | Refills: 1 | Status: SHIPPED | OUTPATIENT
Start: 2022-10-07

## 2022-10-07 RX ORDER — VALSARTAN 160 MG/1
160 TABLET ORAL NIGHTLY
Qty: 90 TABLET | Refills: 1 | Status: SHIPPED | OUTPATIENT
Start: 2022-10-07

## 2022-10-07 RX ORDER — METOPROLOL TARTRATE 50 MG/1
50 TABLET, FILM COATED ORAL NIGHTLY
Qty: 90 TABLET | Refills: 1 | Status: SHIPPED | OUTPATIENT
Start: 2022-10-07

## 2022-10-07 NOTE — TELEPHONE ENCOUNTER
Please review refill protocol failed/ no protocol  Requested Prescriptions   Pending Prescriptions Disp Refills    AMLODIPINE 5 MG Oral Tab [Pharmacy Med Name: AMLODIPINE BESYLATE 5 MG TAB] 30 tablet 1     Sig: Take 1 tablet (5 mg total) by mouth daily. Hypertensive Medications Protocol Failed - 10/7/2022  9:15 AM        Failed - CMP or BMP in past 6 months     No results found for this or any previous visit (from the past 4392 hour(s)).               Passed - In person appointment in the past 12 or next 3 months       Recent Outpatient Visits              1 month ago Essential hypertension    [address], [place], Raven Devine MD    Office Visit    9 months ago Annual physical exam    Oli Jackson MD    Office Visit    1 year ago Essential hypertension    Raven Pete MD    Office Visit    2 years ago Annual physical exam    Oli Jcakson MD    Office Visit    3 years ago Essential hypertension    [address] [place], Raven Devine MD    Office Visit                 Passed - Last BP reading less than 140/90     BP Readings from Last 1 Encounters:  08/22/22 : 130/68                Passed - In person appointment or virtual visit in the past 6 months       Recent Outpatient Visits              1 month ago Essential hypertension    Raven Pete MD    Office Visit    9 months ago Annual physical exam    Oli Jackson MD    Office Visit    1 year ago Essential hypertension    Raven Pete MD    Office Visit    2 years ago Annual physical exam    Oli Jackson MD    Office Visit    3 years ago Essential hypertension    [address], [place], Raven Devine MD    Office Visit Passed - EGFRCR or GFRNAA > 50     GFR Evaluation  GFRNAA: 50 , resulted on 12/22/2021               METOPROLOL TARTRATE 50 MG Oral Tab [Pharmacy Med Name: METOPROLOL TARTRATE 50 MG TAB] 30 tablet 1     Sig: TAKE 1 TABLET BY MOUTH EVERY DAY AT NIGHT        Hypertensive Medications Protocol Failed - 10/7/2022  9:15 AM        Failed - CMP or BMP in past 6 months     No results found for this or any previous visit (from the past 4392 hour(s)).               Passed - In person appointment in the past 12 or next 3 months       Recent Outpatient Visits              1 month ago Essential hypertension    [address], [address], Annetta Jalloh MD    Office Visit    9 months ago Annual physical exam    Calista Mercer MD    Office Visit    1 year ago Essential hypertension    Annetta Oneal MD    Office Visit    2 years ago Annual physical exam    Calista Mercer MD    Office Visit    3 years ago Essential hypertension    [address], [address], Annetta Jalloh MD    Office Visit                 Passed - Last BP reading less than 140/90     BP Readings from Last 1 Encounters:  08/22/22 : 130/68                Passed - In person appointment or virtual visit in the past 6 months       Recent Outpatient Visits              1 month ago Essential hypertension    Annetta Oneal MD    Office Visit    9 months ago Annual physical exam    Calista Mercer MD    Office Visit    1 year ago Essential hypertension    Annetta Oneal MD    Office Visit    2 years ago Annual physical exam    Calista Mercer MD    Office Visit    3 years ago Essential hypertension    Annetta Oneal MD Office Visit                 Passed - EGFRCR or GFRNAA > 50     GFR Evaluation  GFRNAA: 50 , resulted on 12/22/2021               VALSARTAN 160 MG Oral Tab [Pharmacy Med Name: VALSARTAN 160 MG TABLET] 30 tablet 1     Sig: Take 1 tablet (160 mg total) by mouth nightly. Hypertensive Medications Protocol Failed - 10/7/2022  9:15 AM        Failed - CMP or BMP in past 6 months     No results found for this or any previous visit (from the past 4392 hour(s)).               Passed - In person appointment in the past 12 or next 3 months       Recent Outpatient Visits              1 month ago Essential hypertension    [address], [address], Vadim Olivia MD    Office Visit    9 months ago Annual physical exam    Ramya Son MD    Office Visit    1 year ago Essential hypertension    Marchelle Lesch, Harlo Monarch, MD    Office Visit    2 years ago Annual physical exam    Ramya Son MD    Office Visit    3 years ago Essential hypertension    [address], [address], Vadim Olivia MD    Office Visit                 Passed - Last BP reading less than 140/90     BP Readings from Last 1 Encounters:  08/22/22 : 130/68                Passed - In person appointment or virtual visit in the past 6 months       Recent Outpatient Visits              1 month ago Essential hypertension    Marchelle Lesch, Harlo Monarch, MD    Office Visit    9 months ago Annual physical exam    Ramya Son MD    Office Visit    1 year ago Essential hypertension    Marchelle Lesch, Harlo Monarch, MD    Office Visit    2 years ago Annual physical exam    Ramya Son MD    Office Visit    3 years ago Essential hypertension    Marchelle Lesch, Harlo Monarch, MD Office Visit                 Passed [facility] or GFRNAA > 50     GFR Evaluation  GFRNAA: 50 , resulted on 12/22/2021

## 2022-11-07 ENCOUNTER — NURSE ONLY (OUTPATIENT)
Facility: CLINIC | Age: 62
End: 2022-11-07

## 2022-11-07 NOTE — PROGRESS NOTES
Patient has not filled out questionnaires. Patient advised he will need to reschedule. States he does not know how to do MyChart so he will call back for office visit appointment.

## 2023-01-03 ENCOUNTER — OFFICE VISIT (OUTPATIENT)
Dept: GASTROENTEROLOGY | Facility: CLINIC | Age: 63
End: 2023-01-03
Payer: COMMERCIAL

## 2023-01-03 ENCOUNTER — TELEPHONE (OUTPATIENT)
Dept: GASTROENTEROLOGY | Facility: CLINIC | Age: 63
End: 2023-01-03

## 2023-01-03 VITALS
DIASTOLIC BLOOD PRESSURE: 72 MMHG | SYSTOLIC BLOOD PRESSURE: 115 MMHG | WEIGHT: 310 LBS | BODY MASS INDEX: 41.99 KG/M2 | HEIGHT: 72 IN | HEART RATE: 105 BPM

## 2023-01-03 DIAGNOSIS — Z12.11 COLON CANCER SCREENING: Primary | ICD-10-CM

## 2023-01-03 PROCEDURE — 3008F BODY MASS INDEX DOCD: CPT | Performed by: INTERNAL MEDICINE

## 2023-01-03 PROCEDURE — 3078F DIAST BP <80 MM HG: CPT | Performed by: INTERNAL MEDICINE

## 2023-01-03 PROCEDURE — 3074F SYST BP LT 130 MM HG: CPT | Performed by: INTERNAL MEDICINE

## 2023-01-03 PROCEDURE — S0285 CNSLT BEFORE SCREEN COLONOSC: HCPCS | Performed by: INTERNAL MEDICINE

## 2023-01-03 RX ORDER — PEG-3350, SODIUM SULFATE, SODIUM CHLORIDE, POTASSIUM CHLORIDE, SODIUM ASCORBATE AND ASCORBIC ACID 7.5-2.691G
1 KIT ORAL AS DIRECTED
Qty: 1 EACH | Refills: 1 | Status: SHIPPED | OUTPATIENT
Start: 2023-01-03

## 2023-01-03 NOTE — TELEPHONE ENCOUNTER
Scheduled for:  Colonoscopy [address]  Provider Name: Dr Chelsea Marie   Date: Wed 3/22/2023  Location: [facility]   Sedation: MAC  Time: 7:45 am   Prep: split moviprep   Meds/Allergies Reconciled?: NKDA   Diagnosis with codes: CRC screening Z12.11   Was patient informed to call insurance with codes (Y/N):  Yes   Referral sent?:  Yes  [address] [place] [address] or [facility] notified?:  I sent an electronic request to Endo Scheduling and received a confirmation today. Medication Orders:  Hold valsartan the night before and morning of the procedure  Pt is aware to NOT take iron pills, herbal meds and diet supplements for 7 days before exam. Also to NOT take any form of alcohol, recreational drugs and any forms of ED meds 24 hours before exam.     Misc Orders:       Further instructions given by staff:   Instructions given in office and pt verbalized understanding

## 2023-01-03 NOTE — PATIENT INSTRUCTIONS
1. Schedule colonoscopy with anesthesia [Diagnosis: CRC screening]    2.  bowel prep from pharmacy (split dose moviprep)    3. Medication adjustment:       A. Hold valsartan the night before and morning of the procedure    4. Read all bowel prep instructions carefully    5. AVOID seeds, nuts, popcorn, raw fruits and vegetables (cooked is okay) for 2-3 days before procedure    6. You will need to go for COVID testing 72 hours before procedure. The testing team will call you a few days before your procedure to notify you where/when you can get COVID testing. If you do not go for COVID testing, the procedure cannot be performed. 7. If you start any NEW medication after your visit today, please notify us. Certain medications will need to be held before the procedure, or the procedure cannot be performed.

## 2023-03-22 ENCOUNTER — HOSPITAL ENCOUNTER (OUTPATIENT)
Dept: CT IMAGING | Facility: HOSPITAL | Age: 63
Discharge: HOME | End: 2023-03-22
Attending: INTERNAL MEDICINE
Payer: COMMERCIAL

## 2023-03-22 ENCOUNTER — ANESTHESIA EVENT (OUTPATIENT)
Dept: ENDOSCOPY | Facility: HOSPITAL | Age: 63
End: 2023-03-22
Payer: COMMERCIAL

## 2023-03-22 ENCOUNTER — LAB ENCOUNTER (OUTPATIENT)
Dept: LAB | Facility: HOSPITAL | Age: 63
End: 2023-03-22
Attending: INTERNAL MEDICINE
Payer: COMMERCIAL

## 2023-03-22 ENCOUNTER — HOSPITAL ENCOUNTER (OUTPATIENT)
Facility: HOSPITAL | Age: 63
Setting detail: HOSPITAL OUTPATIENT SURGERY
Discharge: HOME | End: 2023-03-22
Attending: INTERNAL MEDICINE | Admitting: INTERNAL MEDICINE
Payer: COMMERCIAL

## 2023-03-22 ENCOUNTER — ANESTHESIA (OUTPATIENT)
Dept: ENDOSCOPY | Facility: HOSPITAL | Age: 63
End: 2023-03-22
Payer: COMMERCIAL

## 2023-03-22 VITALS
RESPIRATION RATE: 16 BRPM | SYSTOLIC BLOOD PRESSURE: 95 MMHG | HEART RATE: 50 BPM | OXYGEN SATURATION: 96 % | HEIGHT: 72 IN | BODY MASS INDEX: 39.28 KG/M2 | WEIGHT: 290 LBS | DIASTOLIC BLOOD PRESSURE: 59 MMHG

## 2023-03-22 DIAGNOSIS — K63.89 COLONIC MASS: ICD-10-CM

## 2023-03-22 DIAGNOSIS — Z12.11 COLON CANCER SCREENING: ICD-10-CM

## 2023-03-22 LAB
ALBUMIN SERPL-MCNC: 4 G/DL (ref 3.4–5)
ALBUMIN/GLOB SERPL: 1.1 {RATIO} (ref 1–2)
ALP LIVER SERPL-CCNC: 67 U/L
ALT SERPL-CCNC: 33 U/L
ANION GAP SERPL CALC-SCNC: 8 MMOL/L (ref 0–18)
AST SERPL-CCNC: 23 U/L (ref 15–37)
BASOPHILS # BLD AUTO: 0.06 X10(3) UL (ref 0–0.2)
BASOPHILS NFR BLD AUTO: 0.9 %
BILIRUB SERPL-MCNC: 0.3 MG/DL (ref 0.1–2)
BUN BLD-MCNC: 25 MG/DL (ref 7–18)
BUN/CREAT SERPL: 17.6 (ref 10–20)
CALCIUM BLD-MCNC: 9.7 MG/DL (ref 8.5–10.1)
CEA SERPL-MCNC: 1.2 NG/ML (ref ?–5)
CHLORIDE SERPL-SCNC: 109 MMOL/L (ref 98–112)
CO2 SERPL-SCNC: 25 MMOL/L (ref 21–32)
CREAT BLD-MCNC: 1.4 MG/DL
CREAT BLD-MCNC: 1.42 MG/DL
DEPRECATED RDW RBC AUTO: 44.2 FL (ref 35.1–46.3)
EOSINOPHIL # BLD AUTO: 0.55 X10(3) UL (ref 0–0.7)
EOSINOPHIL NFR BLD AUTO: 8.7 %
ERYTHROCYTE [DISTWIDTH] IN BLOOD BY AUTOMATED COUNT: 14.2 % (ref 11–15)
FASTING STATUS PATIENT QL REPORTED: YES
GFR SERPLBLD BASED ON 1.73 SQ M-ARVRAT: 57 ML/MIN/1.73M2 (ref 60–?)
GFR SERPLBLD BASED ON 1.73 SQ M-ARVRAT: 58 ML/MIN/1.73M2 (ref 60–?)
GLOBULIN PLAS-MCNC: 3.8 G/DL (ref 2.8–4.4)
GLUCOSE BLD-MCNC: 96 MG/DL (ref 70–99)
HCT VFR BLD AUTO: 37.8 %
HGB BLD-MCNC: 12 G/DL
IMM GRANULOCYTES # BLD AUTO: 0.01 X10(3) UL (ref 0–1)
IMM GRANULOCYTES NFR BLD: 0.2 %
LYMPHOCYTES # BLD AUTO: 2.03 X10(3) UL (ref 1–4)
LYMPHOCYTES NFR BLD AUTO: 32.1 %
MCH RBC QN AUTO: 27.1 PG (ref 26–34)
MCHC RBC AUTO-ENTMCNC: 31.7 G/DL (ref 31–37)
MCV RBC AUTO: 85.5 FL
MONOCYTES # BLD AUTO: 0.54 X10(3) UL (ref 0.1–1)
MONOCYTES NFR BLD AUTO: 8.5 %
NEUTROPHILS # BLD AUTO: 3.13 X10 (3) UL (ref 1.5–7.7)
NEUTROPHILS # BLD AUTO: 3.13 X10(3) UL (ref 1.5–7.7)
NEUTROPHILS NFR BLD AUTO: 49.6 %
OSMOLALITY SERPL CALC.SUM OF ELEC: 298 MOSM/KG (ref 275–295)
PLATELET # BLD AUTO: 278 10(3)UL (ref 150–450)
POTASSIUM SERPL-SCNC: 4.3 MMOL/L (ref 3.5–5.1)
PROT SERPL-MCNC: 7.8 G/DL (ref 6.4–8.2)
RBC # BLD AUTO: 4.42 X10(6)UL
SODIUM SERPL-SCNC: 142 MMOL/L (ref 136–145)
WBC # BLD AUTO: 6.3 X10(3) UL (ref 4–11)

## 2023-03-22 PROCEDURE — 36415 COLL VENOUS BLD VENIPUNCTURE: CPT

## 2023-03-22 PROCEDURE — 71260 CT THORAX DX C+: CPT | Performed by: INTERNAL MEDICINE

## 2023-03-22 PROCEDURE — 45381 COLONOSCOPY SUBMUCOUS NJX: CPT | Performed by: INTERNAL MEDICINE

## 2023-03-22 PROCEDURE — 0DBL8ZX EXCISION OF TRANSVERSE COLON, VIA NATURAL OR ARTIFICIAL OPENING ENDOSCOPIC, DIAGNOSTIC: ICD-10-PCS | Performed by: INTERNAL MEDICINE

## 2023-03-22 PROCEDURE — 85025 COMPLETE CBC W/AUTO DIFF WBC: CPT

## 2023-03-22 PROCEDURE — 82378 CARCINOEMBRYONIC ANTIGEN: CPT

## 2023-03-22 PROCEDURE — 3E0H8GC INTRODUCTION OF OTHER THERAPEUTIC SUBSTANCE INTO LOWER GI, VIA NATURAL OR ARTIFICIAL OPENING ENDOSCOPIC: ICD-10-PCS | Performed by: INTERNAL MEDICINE

## 2023-03-22 PROCEDURE — 80053 COMPREHEN METABOLIC PANEL: CPT

## 2023-03-22 PROCEDURE — 45380 COLONOSCOPY AND BIOPSY: CPT | Performed by: INTERNAL MEDICINE

## 2023-03-22 PROCEDURE — 74177 CT ABD & PELVIS W/CONTRAST: CPT | Performed by: INTERNAL MEDICINE

## 2023-03-22 PROCEDURE — 0DDK8ZX EXTRACTION OF ASCENDING COLON, VIA NATURAL OR ARTIFICIAL OPENING ENDOSCOPIC, DIAGNOSTIC: ICD-10-PCS | Performed by: INTERNAL MEDICINE

## 2023-03-22 PROCEDURE — 0DBP8ZX EXCISION OF RECTUM, VIA NATURAL OR ARTIFICIAL OPENING ENDOSCOPIC, DIAGNOSTIC: ICD-10-PCS | Performed by: INTERNAL MEDICINE

## 2023-03-22 PROCEDURE — 82565 ASSAY OF CREATININE: CPT

## 2023-03-22 PROCEDURE — 45385 COLONOSCOPY W/LESION REMOVAL: CPT | Performed by: INTERNAL MEDICINE

## 2023-03-22 RX ORDER — LIDOCAINE HYDROCHLORIDE 10 MG/ML
INJECTION, SOLUTION EPIDURAL; INFILTRATION; INTRACAUDAL; PERINEURAL AS NEEDED
Status: DISCONTINUED | OUTPATIENT
Start: 2023-03-22 | End: 2023-03-22 | Stop reason: SURG

## 2023-03-22 RX ORDER — SODIUM CHLORIDE 9 MG/ML
INJECTION, SOLUTION INTRAVENOUS CONTINUOUS PRN
Status: DISCONTINUED | OUTPATIENT
Start: 2023-03-22 | End: 2023-03-22 | Stop reason: SURG

## 2023-03-22 RX ORDER — SODIUM CHLORIDE, SODIUM LACTATE, POTASSIUM CHLORIDE, CALCIUM CHLORIDE 600; 310; 30; 20 MG/100ML; MG/100ML; MG/100ML; MG/100ML
INJECTION, SOLUTION INTRAVENOUS CONTINUOUS
Status: DISCONTINUED | OUTPATIENT
Start: 2023-03-22 | End: 2023-03-22

## 2023-03-22 RX ORDER — NALOXONE HYDROCHLORIDE 0.4 MG/ML
80 INJECTION, SOLUTION INTRAMUSCULAR; INTRAVENOUS; SUBCUTANEOUS AS NEEDED
Status: DISCONTINUED | OUTPATIENT
Start: 2023-03-22 | End: 2023-03-22

## 2023-03-22 RX ADMIN — LIDOCAINE HYDROCHLORIDE 50 MG: 10 INJECTION, SOLUTION EPIDURAL; INFILTRATION; INTRACAUDAL; PERINEURAL at 07:39:00

## 2023-03-22 RX ADMIN — SODIUM CHLORIDE: 9 INJECTION, SOLUTION INTRAVENOUS at 08:09:00

## 2023-03-22 RX ADMIN — SODIUM CHLORIDE: 9 INJECTION, SOLUTION INTRAVENOUS at 07:30:00

## 2023-03-22 NOTE — DISCHARGE INSTRUCTIONS
CT scan STAT - central scheduling # [address] Instructions for Colonoscopy with Sedation    Diet:  - Resume your regular diet as tolerated unless otherwise instructed. - Start with light meals to minimize bloating.  - Do not drink alcohol today. Medication:  - If you have questions about resuming your normal medications, please contact your Primary Care Physician. Activities:  - Take it easy today. Do not return to work today. - Do not drive today. - Do not operate any machinery today (including kitchen equipment). Colonoscopy:  - You may notice some rectal \"spotting\" (a little blood on the toilet tissue) for a day or two after the exam. This is normal.  - If you experience any rectal bleeding (not spotting), persistent tenderness or sharp severe abdominal pains, oral temperature over 100 degrees Fahrenheit, light-headedness or dizziness, or any other problems, contact your doctor. **If unable to reach your doctor, please go to the [facility] Emergency Room**    - Your referring physician will receive a full report of your examination.  - If you do not hear from your doctor's office within two weeks of your biopsy, please call them for your results. You may be able to see your laboratory results in [facility] between 4 and 7 business days. In some cases, your physician may not have viewed the results before they are released to [address] If you have questions regarding your results contact the physician who ordered the test/exam by phone or via [address] by choosing \"Ask a Medical Question. \"

## 2023-03-22 NOTE — ANESTHESIA POSTPROCEDURE EVALUATION
Patient: Julita Reed    Procedure Summary     Date: 03/22/23 Room / Location: [facility] ENDOSCOPY 01 / [facility] ENDOSCOPY    Anesthesia Start: 8186 Anesthesia Stop: 0545    Procedure: COLONOSCOPY Diagnosis:       Colon cancer screening      (Ascending Colon Mass, Colon Polyp, Diverticulosis, Hemorrhoids)    Surgeons: Candido Lockhart MD Anesthesiologist: Iker Lepe CRNA    Anesthesia Type: MAC ASA Status: 3          Anesthesia Type: MAC    Vitals Value Taken Time   /53 03/22/23 0843   Temp / 03/22/23 0845   Pulse 53 03/22/23 0844   Resp 18 03/22/23 0844   SpO2 96 % 03/22/23 0844   Vitals shown include unvalidated device data.     [facility] AN Post Evaluation    Camilo Norman CRNA  3/22/2023 8:45 AM

## 2023-03-22 NOTE — ANESTHESIA POSTPROCEDURE EVALUATION
Patient: Geo Pro    Procedure Summary     Date: 03/22/23 Room / Location: [address] [place] [address] ENDOSCOPY 01 / [address] [place] [address] ENDOSCOPY    Anesthesia Start: 3387 Anesthesia Stop:     Procedure: COLONOSCOPY Diagnosis:       Colon cancer screening      (Ascending Colon Mass, Colon Polyp, Diverticulosis, Hemorrhoids)    Surgeons: Richard Morris MD Anesthesiologist: Nano Scherer CRNA    Anesthesia Type: MAC ASA Status: 3          Anesthesia Type: MAC    Vitals Value Taken Time   BP 82/59 03/22/23 0815   Temp / 03/22/23 0815   Pulse 57 03/22/23 0815   Resp 17 03/22/23 0815   SpO2 96% 03/22/23 0815       [facility] AN Post Evaluation:   Patient Evaluated in PACU  Patient Participation: waiting for patient participation  Level of Consciousness: sleepy but conscious  Pain Score: 0  Pain Management: adequate  Airway Patency:patent  Dental exam unchanged from preop  Yes    Cardiovascular Status: acceptable and hemodynamically stable  Respiratory Status: acceptable and face mask  Postoperative Hydration acceptable      Kymberly Schulte CRNA  3/22/2023 8:15 AM

## 2023-03-23 ENCOUNTER — TELEPHONE (OUTPATIENT)
Dept: HEMATOLOGY/ONCOLOGY | Facility: HOSPITAL | Age: 63
End: 2023-03-23

## 2023-03-23 NOTE — TELEPHONE ENCOUNTER
Patient returned my call and I made sure to introduce myself and explain my role as patient navigator and reconfirmed my name and contact information with patient for him to have for any assistance he may need while under the care of the cancer center. I then confirmed a new consult appointment with Dr. Lu De La Torre (Med Onc) for Wed., 3/29/23 at 12:30 pm in the [facility] immediately following his 12 noon consult appointment with Dr. Gretchen Ortega (Surg Onc). Pt very well aware of our location already and I told him that both Dr. Gretchen Ortega and Dr. Lu De La Torre would be seeing him in the cancer center on the 29th. Patient thanked me for the assistance.

## 2023-03-23 NOTE — TELEPHONE ENCOUNTER
Attempted to call patient on his preferred number and left a message introducing myself and asked for a return call as I wanted to confirm a new consult with Dr. Samantha Walker for Wed, 3/29/23 at 12:30 (immediately following pt's appointment with Dr. Lissy Chaparro at 12 noon). I will await a call back. I also tried the home number listed but there was no answer or machine taking messages.

## 2023-03-29 ENCOUNTER — OFFICE VISIT (OUTPATIENT)
Dept: SURGERY | Facility: CLINIC | Age: 63
End: 2023-03-29
Payer: COMMERCIAL

## 2023-03-29 ENCOUNTER — OFFICE VISIT (OUTPATIENT)
Dept: HEMATOLOGY/ONCOLOGY | Facility: HOSPITAL | Age: 63
End: 2023-03-29
Attending: STUDENT IN AN ORGANIZED HEALTH CARE EDUCATION/TRAINING PROGRAM
Payer: COMMERCIAL

## 2023-03-29 VITALS
BODY MASS INDEX: 39.42 KG/M2 | SYSTOLIC BLOOD PRESSURE: 121 MMHG | TEMPERATURE: 97 F | DIASTOLIC BLOOD PRESSURE: 71 MMHG | RESPIRATION RATE: 16 BRPM | HEIGHT: 72 IN | WEIGHT: 291 LBS | HEART RATE: 65 BPM | OXYGEN SATURATION: 97 %

## 2023-03-29 VITALS
RESPIRATION RATE: 16 BRPM | SYSTOLIC BLOOD PRESSURE: 121 MMHG | DIASTOLIC BLOOD PRESSURE: 71 MMHG | HEIGHT: 72 IN | OXYGEN SATURATION: 97 % | TEMPERATURE: 97 F | HEART RATE: 65 BPM | WEIGHT: 291 LBS | BODY MASS INDEX: 39.42 KG/M2

## 2023-03-29 DIAGNOSIS — C18.2 PRIMARY ADENOCARCINOMA OF ASCENDING COLON (HCC): Primary | ICD-10-CM

## 2023-03-29 DIAGNOSIS — C18.2 MALIGNANT NEOPLASM OF ASCENDING COLON (HCC): Primary | ICD-10-CM

## 2023-03-29 PROCEDURE — 99245 OFF/OP CONSLTJ NEW/EST HI 55: CPT | Performed by: SURGERY

## 2023-03-29 PROCEDURE — 3074F SYST BP LT 130 MM HG: CPT | Performed by: SURGERY

## 2023-03-29 PROCEDURE — 3008F BODY MASS INDEX DOCD: CPT | Performed by: SURGERY

## 2023-03-29 PROCEDURE — 3078F DIAST BP <80 MM HG: CPT | Performed by: STUDENT IN AN ORGANIZED HEALTH CARE EDUCATION/TRAINING PROGRAM

## 2023-03-29 PROCEDURE — 3078F DIAST BP <80 MM HG: CPT | Performed by: SURGERY

## 2023-03-29 PROCEDURE — 3074F SYST BP LT 130 MM HG: CPT | Performed by: STUDENT IN AN ORGANIZED HEALTH CARE EDUCATION/TRAINING PROGRAM

## 2023-03-29 PROCEDURE — 3008F BODY MASS INDEX DOCD: CPT | Performed by: STUDENT IN AN ORGANIZED HEALTH CARE EDUCATION/TRAINING PROGRAM

## 2023-03-29 PROCEDURE — 99244 OFF/OP CNSLTJ NEW/EST MOD 40: CPT | Performed by: STUDENT IN AN ORGANIZED HEALTH CARE EDUCATION/TRAINING PROGRAM

## 2023-03-29 RX ORDER — METRONIDAZOLE 500 MG/1
TABLET ORAL
Qty: 3 TABLET | Refills: 0 | Status: SHIPPED | OUTPATIENT
Start: 2023-03-29

## 2023-03-29 RX ORDER — CIPROFLOXACIN 250 MG/1
TABLET, FILM COATED ORAL
Qty: 6 TABLET | Refills: 0 | Status: SHIPPED | OUTPATIENT
Start: 2023-03-29

## 2023-03-29 NOTE — PATIENT INSTRUCTIONS
Surgery: XI Robotic-assisted, laparoscopic, possible open, right hemicolectomy. Date of Surgery:[facility]    Hospital:    [address] [place] [address]   Phone: [phone]    This is an inpatient procedure. Use the provided Chlorhexadine surgical soap(instructions attached) to shower the night before and morning of your procedure. Do not apply powders, creams, lotions or deodorant after showering. Do not apply any kind of makeup and make sure to remove nail polish prior to your surgery. For faster recovery from anesthesia and surgery please follow the instructions below regarding your pre-op diet:    Bowel Prep diet and instructions for colon surgery: The day before surgery you may have a small breakfast and then start a clear liquid diet. This diet includes: clear beverages, clear soup or broth, and Jell-O. A liquid bowel preparation will be called into your pharmacy. You must drink this preparation the day before surgery to clean out your colon. Follow the directions for the prep on the prescription and begin to take the prep between 2pm-3pm the day prior to surgery. You will also need to take 2 oral antibiotics the day before your surgery to reduce the risk of infection. You will get a prescription for these or they will be sent electronically to your pharmacy. The antibiotics include: Ciprofloxacin 250mg and Flagyl 500mg. You will take these as directed at 2pm, 3pm, and 10pm the day before surgery. 12 hours prior to your surgery time you are to drink one 10oz bottle of Ensure Pre-Surgery Drink. You are to have NO solid food or water after 11pm the night before your surgery EXCEPT one additional 10oz bottle of Ensure Pre-Surgery Drink. You need to finish drinking this 4 hours prior to surgery time. Take Tylenol 1000mg by mouth at the time of your second Ensure Pre-Surgery drink(4hrs prior to surgery time), unless instructed otherwise by your surgeon.      Bring your picture ID and insurance card with you. Wear comfortable clothing that can easily be removed. Preferably, something that zips, snaps, or buttons up the front. You will be contacted by the hospital the day prior to your surgery to confirm details and give you specific instructions about when and where to arrive the day of your procedure. If you are taking blood thinners including: Plavix, Eliquis, Coumadin you will need to contact the prescribing provider for specific instructions on holding these medications for your procedure  Motrin, Advil, Ibuprofen, Aspirin, Baby Aspirin and Fish Oil are also blood thinners and need to be held at least one week prior to your procedure. It is okay to take Tylenol. Inform your primary care physician of your surgery and ask if him/her will need to see you prior to surgery. Pre-Operative Testing  x CBC x CMP  BMP    PT, PTT, INR  UA x EKG    Chest X-Ray  Cardiac Clearance x [facility] Clearance       Stephanie Rivera M.D.   [facility] nurse line:  [phone]  Monday through Friday 8:00am to 4:30pm.     For Dr. Adria Bang office: [phone]/ Fax: [phone]  After hours you will reach the answering service     Central [phone]  Medical Records:   [phone]

## 2023-03-30 ENCOUNTER — TELEPHONE (OUTPATIENT)
Dept: SURGERY | Facility: CLINIC | Age: 63
End: 2023-03-30

## 2023-03-30 DIAGNOSIS — C18.2 MALIGNANT NEOPLASM OF ASCENDING COLON (HCC): Primary | ICD-10-CM

## 2023-03-30 NOTE — TELEPHONE ENCOUNTER
I notified patient with surgery date and to please schedule an appointment to see PCP for a preop visit.

## 2023-04-05 ENCOUNTER — TELEPHONE (OUTPATIENT)
Dept: INTERNAL MEDICINE CLINIC | Facility: CLINIC | Age: 63
End: 2023-04-05

## 2023-04-05 NOTE — TELEPHONE ENCOUNTER
Spoke with patient and answered his question in regards to Px appointment. Patient had no further questions.

## 2023-04-05 NOTE — TELEPHONE ENCOUNTER
Patient wants to know if his Pre-op visit on April 10th with Dr Sudheer Bowen can also be a physical visit. I told him they need to be two separate visits. He wants someone to call to explain why.

## 2023-04-08 PROBLEM — I70.0 AORTIC ATHEROSCLEROSIS: Status: ACTIVE | Noted: 2023-04-08

## 2023-04-08 PROBLEM — I70.0 AORTIC ATHEROSCLEROSIS (HCC): Status: ACTIVE | Noted: 2023-04-08

## 2023-04-10 ENCOUNTER — OFFICE VISIT (OUTPATIENT)
Dept: INTERNAL MEDICINE CLINIC | Facility: CLINIC | Age: 63
End: 2023-04-10

## 2023-04-10 VITALS
WEIGHT: 294.31 LBS | HEIGHT: 72 IN | BODY MASS INDEX: 39.86 KG/M2 | SYSTOLIC BLOOD PRESSURE: 134 MMHG | HEART RATE: 76 BPM | DIASTOLIC BLOOD PRESSURE: 70 MMHG

## 2023-04-10 DIAGNOSIS — C18.2 PRIMARY ADENOCARCINOMA OF ASCENDING COLON (HCC): ICD-10-CM

## 2023-04-10 DIAGNOSIS — R73.03 PREDIABETES: ICD-10-CM

## 2023-04-10 DIAGNOSIS — E03.8 SUBCLINICAL HYPOTHYROIDISM: ICD-10-CM

## 2023-04-10 DIAGNOSIS — Z01.818 PREOPERATIVE EXAMINATION: Primary | ICD-10-CM

## 2023-04-10 DIAGNOSIS — N18.30 STAGE 3 CHRONIC KIDNEY DISEASE, UNSPECIFIED WHETHER STAGE 3A OR 3B CKD (HCC): ICD-10-CM

## 2023-04-10 DIAGNOSIS — E78.5 DYSLIPIDEMIA: ICD-10-CM

## 2023-04-10 DIAGNOSIS — I10 ESSENTIAL HYPERTENSION: ICD-10-CM

## 2023-04-10 NOTE — PATIENT INSTRUCTIONS
Your blood pressure today was good at 134/70. Please come in soon for blood tests and an EKG. Pending lab results, you are stable for your scheduled surgery. Continue current medications. Return visit in 6 months.

## 2023-04-12 ENCOUNTER — LAB ENCOUNTER (OUTPATIENT)
Dept: LAB | Age: 63
End: 2023-04-12
Attending: INTERNAL MEDICINE
Payer: COMMERCIAL

## 2023-04-12 DIAGNOSIS — Z01.818 PREOPERATIVE EXAMINATION: ICD-10-CM

## 2023-04-12 LAB
ALBUMIN SERPL-MCNC: 4.1 G/DL (ref 3.4–5)
ALBUMIN/GLOB SERPL: 1.2 {RATIO} (ref 1–2)
ALP LIVER SERPL-CCNC: 64 U/L
ALT SERPL-CCNC: 33 U/L
ANION GAP SERPL CALC-SCNC: 8 MMOL/L (ref 0–18)
AST SERPL-CCNC: 18 U/L (ref 15–37)
ATRIAL RATE: 52 BPM
BILIRUB SERPL-MCNC: 0.3 MG/DL (ref 0.1–2)
BUN BLD-MCNC: 20 MG/DL (ref 7–18)
BUN/CREAT SERPL: 15.5 (ref 10–20)
CALCIUM BLD-MCNC: 9.8 MG/DL (ref 8.5–10.1)
CHLORIDE SERPL-SCNC: 111 MMOL/L (ref 98–112)
CHOLEST SERPL-MCNC: 135 MG/DL (ref ?–200)
CO2 SERPL-SCNC: 24 MMOL/L (ref 21–32)
COMPLEXED PSA SERPL-MCNC: 1.39 NG/ML (ref ?–4)
CREAT BLD-MCNC: 1.29 MG/DL
DEPRECATED RDW RBC AUTO: 45.5 FL (ref 35.1–46.3)
ERYTHROCYTE [DISTWIDTH] IN BLOOD BY AUTOMATED COUNT: 14.8 % (ref 11–15)
EST. AVERAGE GLUCOSE BLD GHB EST-MCNC: 131 MG/DL (ref 68–126)
FASTING PATIENT LIPID ANSWER: YES
FASTING STATUS PATIENT QL REPORTED: YES
GFR SERPLBLD BASED ON 1.73 SQ M-ARVRAT: 63 ML/MIN/1.73M2 (ref 60–?)
GLOBULIN PLAS-MCNC: 3.5 G/DL (ref 2.8–4.4)
GLUCOSE BLD-MCNC: 90 MG/DL (ref 70–99)
HBA1C MFR BLD: 6.2 % (ref ?–5.7)
HCT VFR BLD AUTO: 37.9 %
HDLC SERPL-MCNC: 40 MG/DL (ref 40–59)
HGB BLD-MCNC: 12 G/DL
LDLC SERPL CALC-MCNC: 72 MG/DL (ref ?–100)
MCH RBC QN AUTO: 26.8 PG (ref 26–34)
MCHC RBC AUTO-ENTMCNC: 31.7 G/DL (ref 31–37)
MCV RBC AUTO: 84.8 FL
NONHDLC SERPL-MCNC: 95 MG/DL (ref ?–130)
OSMOLALITY SERPL CALC.SUM OF ELEC: 298 MOSM/KG (ref 275–295)
P AXIS: 40 DEGREES
P-R INTERVAL: 222 MS
PLATELET # BLD AUTO: 309 10(3)UL (ref 150–450)
POTASSIUM SERPL-SCNC: 4.3 MMOL/L (ref 3.5–5.1)
PROT SERPL-MCNC: 7.6 G/DL (ref 6.4–8.2)
Q-T INTERVAL: 414 MS
QRS DURATION: 98 MS
QTC CALCULATION (BEZET): 385 MS
R AXIS: 8 DEGREES
RBC # BLD AUTO: 4.47 X10(6)UL
SODIUM SERPL-SCNC: 143 MMOL/L (ref 136–145)
T AXIS: 41 DEGREES
TRIGL SERPL-MCNC: 132 MG/DL (ref 30–149)
TSI SER-ACNC: 1.7 MIU/ML (ref 0.36–3.74)
VENTRICULAR RATE: 52 BPM
VLDLC SERPL CALC-MCNC: 20 MG/DL (ref 0–30)
WBC # BLD AUTO: 6 X10(3) UL (ref 4–11)

## 2023-04-12 PROCEDURE — 80053 COMPREHEN METABOLIC PANEL: CPT

## 2023-04-12 PROCEDURE — 85027 COMPLETE CBC AUTOMATED: CPT

## 2023-04-12 PROCEDURE — 93010 ELECTROCARDIOGRAM REPORT: CPT | Performed by: INTERNAL MEDICINE

## 2023-04-12 PROCEDURE — 80061 LIPID PANEL: CPT

## 2023-04-12 PROCEDURE — 83036 HEMOGLOBIN GLYCOSYLATED A1C: CPT

## 2023-04-12 PROCEDURE — 84443 ASSAY THYROID STIM HORMONE: CPT

## 2023-04-12 PROCEDURE — 36415 COLL VENOUS BLD VENIPUNCTURE: CPT

## 2023-04-12 PROCEDURE — 93005 ELECTROCARDIOGRAM TRACING: CPT

## 2023-04-17 DIAGNOSIS — C18.2 MALIGNANT NEOPLASM OF ASCENDING COLON (HCC): Primary | ICD-10-CM

## 2023-04-20 RX ORDER — SODIUM CHLORIDE, SODIUM LACTATE, POTASSIUM CHLORIDE, CALCIUM CHLORIDE 600; 310; 30; 20 MG/100ML; MG/100ML; MG/100ML; MG/100ML
INJECTION, SOLUTION INTRAVENOUS CONTINUOUS
Status: CANCELLED | OUTPATIENT
Start: 2023-04-20

## 2023-04-21 ENCOUNTER — LAB ENCOUNTER (OUTPATIENT)
Dept: LAB | Age: 63
End: 2023-04-21
Attending: SURGERY
Payer: COMMERCIAL

## 2023-04-21 DIAGNOSIS — Z01.818 PRE-OP TESTING: ICD-10-CM

## 2023-04-21 LAB
ANTIBODY SCREEN: NEGATIVE
RH BLOOD TYPE: POSITIVE
RH BLOOD TYPE: POSITIVE

## 2023-04-21 PROCEDURE — 86901 BLOOD TYPING SEROLOGIC RH(D): CPT

## 2023-04-21 PROCEDURE — 86850 RBC ANTIBODY SCREEN: CPT

## 2023-04-21 PROCEDURE — 36415 COLL VENOUS BLD VENIPUNCTURE: CPT

## 2023-04-21 PROCEDURE — 86900 BLOOD TYPING SEROLOGIC ABO: CPT

## 2023-04-24 ENCOUNTER — ANESTHESIA (OUTPATIENT)
Dept: SURGERY | Facility: HOSPITAL | Age: 63
End: 2023-04-24
Payer: COMMERCIAL

## 2023-04-24 ENCOUNTER — ANESTHESIA EVENT (OUTPATIENT)
Dept: SURGERY | Facility: HOSPITAL | Age: 63
End: 2023-04-24
Payer: COMMERCIAL

## 2023-04-24 ENCOUNTER — HOSPITAL ENCOUNTER (INPATIENT)
Facility: HOSPITAL | Age: 63
LOS: 2 days | Discharge: HOME | DRG: 331 | End: 2023-04-26
Attending: SURGERY | Admitting: SURGERY
Payer: COMMERCIAL

## 2023-04-24 DIAGNOSIS — Z01.818 PRE-OP TESTING: Primary | ICD-10-CM

## 2023-04-24 DIAGNOSIS — C18.2 MALIGNANT NEOPLASM OF ASCENDING COLON (HCC): ICD-10-CM

## 2023-04-24 PROBLEM — C18.9 ADENOCARCINOMA, COLON (HCC): Status: ACTIVE | Noted: 2023-04-24

## 2023-04-24 PROCEDURE — 8E0W4CZ ROBOTIC ASSISTED PROCEDURE OF TRUNK REGION, PERCUTANEOUS ENDOSCOPIC APPROACH: ICD-10-PCS | Performed by: SURGERY

## 2023-04-24 PROCEDURE — 99232 SBSQ HOSP IP/OBS MODERATE 35: CPT | Performed by: HOSPITALIST

## 2023-04-24 PROCEDURE — 0DTF4ZZ RESECTION OF RIGHT LARGE INTESTINE, PERCUTANEOUS ENDOSCOPIC APPROACH: ICD-10-PCS | Performed by: SURGERY

## 2023-04-24 PROCEDURE — 0WUF47Z SUPPLEMENT ABDOMINAL WALL WITH AUTOLOGOUS TISSUE SUBSTITUTE, PERCUTANEOUS ENDOSCOPIC APPROACH: ICD-10-PCS | Performed by: SURGERY

## 2023-04-24 RX ORDER — MORPHINE SULFATE 10 MG/ML
6 INJECTION, SOLUTION INTRAMUSCULAR; INTRAVENOUS EVERY 10 MIN PRN
Status: DISCONTINUED | OUTPATIENT
Start: 2023-04-24 | End: 2023-04-24 | Stop reason: HOSPADM

## 2023-04-24 RX ORDER — MORPHINE SULFATE 4 MG/ML
2 INJECTION, SOLUTION INTRAMUSCULAR; INTRAVENOUS EVERY 10 MIN PRN
Status: DISCONTINUED | OUTPATIENT
Start: 2023-04-24 | End: 2023-04-24 | Stop reason: HOSPADM

## 2023-04-24 RX ORDER — BUPIVACAINE HYDROCHLORIDE 5 MG/ML
INJECTION, SOLUTION EPIDURAL; INTRACAUDAL AS NEEDED
Status: DISCONTINUED | OUTPATIENT
Start: 2023-04-24 | End: 2023-04-24 | Stop reason: HOSPADM

## 2023-04-24 RX ORDER — ENOXAPARIN SODIUM 100 MG/ML
40 INJECTION SUBCUTANEOUS DAILY
Status: DISCONTINUED | OUTPATIENT
Start: 2023-04-25 | End: 2023-04-26

## 2023-04-24 RX ORDER — SODIUM CHLORIDE, SODIUM LACTATE, POTASSIUM CHLORIDE, CALCIUM CHLORIDE 600; 310; 30; 20 MG/100ML; MG/100ML; MG/100ML; MG/100ML
INJECTION, SOLUTION INTRAVENOUS CONTINUOUS
Status: DISCONTINUED | OUTPATIENT
Start: 2023-04-24 | End: 2023-04-25

## 2023-04-24 RX ORDER — LIDOCAINE HYDROCHLORIDE ANHYDROUS AND DEXTROSE MONOHYDRATE .8; 5 G/100ML; G/100ML
INJECTION, SOLUTION INTRAVENOUS CONTINUOUS PRN
Status: DISCONTINUED | OUTPATIENT
Start: 2023-04-24 | End: 2023-04-24 | Stop reason: SURG

## 2023-04-24 RX ORDER — ACETAMINOPHEN 500 MG
1000 TABLET ORAL ONCE AS NEEDED
Status: DISCONTINUED | OUTPATIENT
Start: 2023-04-24 | End: 2023-04-24 | Stop reason: HOSPADM

## 2023-04-24 RX ORDER — PROCHLORPERAZINE EDISYLATE 5 MG/ML
5 INJECTION INTRAMUSCULAR; INTRAVENOUS EVERY 8 HOURS PRN
Status: DISCONTINUED | OUTPATIENT
Start: 2023-04-24 | End: 2023-04-24 | Stop reason: HOSPADM

## 2023-04-24 RX ORDER — HYDROCODONE BITARTRATE AND ACETAMINOPHEN 5; 325 MG/1; MG/1
1 TABLET ORAL ONCE AS NEEDED
Status: DISCONTINUED | OUTPATIENT
Start: 2023-04-24 | End: 2023-04-24 | Stop reason: HOSPADM

## 2023-04-24 RX ORDER — MORPHINE SULFATE 4 MG/ML
4 INJECTION, SOLUTION INTRAMUSCULAR; INTRAVENOUS EVERY 10 MIN PRN
Status: DISCONTINUED | OUTPATIENT
Start: 2023-04-24 | End: 2023-04-24 | Stop reason: HOSPADM

## 2023-04-24 RX ORDER — HYDROMORPHONE HYDROCHLORIDE 1 MG/ML
0.4 INJECTION, SOLUTION INTRAMUSCULAR; INTRAVENOUS; SUBCUTANEOUS EVERY 5 MIN PRN
Status: DISCONTINUED | OUTPATIENT
Start: 2023-04-24 | End: 2023-04-24 | Stop reason: HOSPADM

## 2023-04-24 RX ORDER — METOCLOPRAMIDE 10 MG/1
10 TABLET ORAL ONCE
Status: COMPLETED | OUTPATIENT
Start: 2023-04-24 | End: 2023-04-24

## 2023-04-24 RX ORDER — METOPROLOL TARTRATE 5 MG/5ML
2.5 INJECTION INTRAVENOUS ONCE
Status: DISCONTINUED | OUTPATIENT
Start: 2023-04-24 | End: 2023-04-24 | Stop reason: HOSPADM

## 2023-04-24 RX ORDER — LOSARTAN POTASSIUM 100 MG/1
100 TABLET ORAL NIGHTLY
Status: DISCONTINUED | OUTPATIENT
Start: 2023-04-24 | End: 2023-04-26

## 2023-04-24 RX ORDER — AMLODIPINE BESYLATE 5 MG/1
5 TABLET ORAL EVERY MORNING
Status: DISCONTINUED | OUTPATIENT
Start: 2023-04-25 | End: 2023-04-26

## 2023-04-24 RX ORDER — OXYCODONE HYDROCHLORIDE 5 MG/1
5 TABLET ORAL EVERY 4 HOURS PRN
Status: DISCONTINUED | OUTPATIENT
Start: 2023-04-24 | End: 2023-04-26

## 2023-04-24 RX ORDER — HYDROMORPHONE HYDROCHLORIDE 1 MG/ML
0.4 INJECTION, SOLUTION INTRAMUSCULAR; INTRAVENOUS; SUBCUTANEOUS EVERY 2 HOUR PRN
Status: DISCONTINUED | OUTPATIENT
Start: 2023-04-24 | End: 2023-04-26

## 2023-04-24 RX ORDER — ROCURONIUM BROMIDE 10 MG/ML
INJECTION, SOLUTION INTRAVENOUS AS NEEDED
Status: DISCONTINUED | OUTPATIENT
Start: 2023-04-24 | End: 2023-04-24 | Stop reason: SURG

## 2023-04-24 RX ORDER — ATORVASTATIN CALCIUM 10 MG/1
10 TABLET, FILM COATED ORAL NIGHTLY
Status: DISCONTINUED | OUTPATIENT
Start: 2023-04-24 | End: 2023-04-26

## 2023-04-24 RX ORDER — SODIUM CHLORIDE 9 MG/ML
INJECTION, SOLUTION INTRAVENOUS CONTINUOUS PRN
Status: DISCONTINUED | OUTPATIENT
Start: 2023-04-24 | End: 2023-04-24 | Stop reason: SURG

## 2023-04-24 RX ORDER — HYDROMORPHONE HYDROCHLORIDE 1 MG/ML
0.2 INJECTION, SOLUTION INTRAMUSCULAR; INTRAVENOUS; SUBCUTANEOUS EVERY 5 MIN PRN
Status: DISCONTINUED | OUTPATIENT
Start: 2023-04-24 | End: 2023-04-24 | Stop reason: HOSPADM

## 2023-04-24 RX ORDER — HYDROCODONE BITARTRATE AND ACETAMINOPHEN 5; 325 MG/1; MG/1
2 TABLET ORAL ONCE AS NEEDED
Status: DISCONTINUED | OUTPATIENT
Start: 2023-04-24 | End: 2023-04-24 | Stop reason: HOSPADM

## 2023-04-24 RX ORDER — METRONIDAZOLE 500 MG/100ML
500 INJECTION, SOLUTION INTRAVENOUS ONCE
Status: COMPLETED | OUTPATIENT
Start: 2023-04-24 | End: 2023-04-24

## 2023-04-24 RX ORDER — FAMOTIDINE 20 MG/1
20 TABLET, FILM COATED ORAL ONCE
Status: COMPLETED | OUTPATIENT
Start: 2023-04-24 | End: 2023-04-24

## 2023-04-24 RX ORDER — LIDOCAINE HYDROCHLORIDE 10 MG/ML
INJECTION, SOLUTION EPIDURAL; INFILTRATION; INTRACAUDAL; PERINEURAL AS NEEDED
Status: DISCONTINUED | OUTPATIENT
Start: 2023-04-24 | End: 2023-04-24 | Stop reason: SURG

## 2023-04-24 RX ORDER — ONDANSETRON 2 MG/ML
4 INJECTION INTRAMUSCULAR; INTRAVENOUS EVERY 6 HOURS PRN
Status: DISCONTINUED | OUTPATIENT
Start: 2023-04-24 | End: 2023-04-24 | Stop reason: HOSPADM

## 2023-04-24 RX ORDER — MAGNESIUM SULFATE HEPTAHYDRATE 500 MG/ML
INJECTION, SOLUTION INTRAMUSCULAR; INTRAVENOUS AS NEEDED
Status: DISCONTINUED | OUTPATIENT
Start: 2023-04-24 | End: 2023-04-24 | Stop reason: SURG

## 2023-04-24 RX ORDER — ACETAMINOPHEN 500 MG
1000 TABLET ORAL ONCE
Status: DISCONTINUED | OUTPATIENT
Start: 2023-04-24 | End: 2023-04-24 | Stop reason: HOSPADM

## 2023-04-24 RX ORDER — SODIUM CHLORIDE, SODIUM LACTATE, POTASSIUM CHLORIDE, CALCIUM CHLORIDE 600; 310; 30; 20 MG/100ML; MG/100ML; MG/100ML; MG/100ML
INJECTION, SOLUTION INTRAVENOUS CONTINUOUS PRN
Status: DISCONTINUED | OUTPATIENT
Start: 2023-04-24 | End: 2023-04-24 | Stop reason: SURG

## 2023-04-24 RX ORDER — SODIUM CHLORIDE, SODIUM LACTATE, POTASSIUM CHLORIDE, CALCIUM CHLORIDE 600; 310; 30; 20 MG/100ML; MG/100ML; MG/100ML; MG/100ML
INJECTION, SOLUTION INTRAVENOUS CONTINUOUS
Status: DISCONTINUED | OUTPATIENT
Start: 2023-04-24 | End: 2023-04-24 | Stop reason: HOSPADM

## 2023-04-24 RX ORDER — ONDANSETRON 2 MG/ML
4 INJECTION INTRAMUSCULAR; INTRAVENOUS EVERY 6 HOURS PRN
Status: DISCONTINUED | OUTPATIENT
Start: 2023-04-24 | End: 2023-04-26

## 2023-04-24 RX ORDER — SODIUM CHLORIDE 9 MG/ML
INJECTION, SOLUTION INTRAVENOUS ONCE
Status: COMPLETED | OUTPATIENT
Start: 2023-04-24 | End: 2023-04-24

## 2023-04-24 RX ORDER — NALOXONE HYDROCHLORIDE 0.4 MG/ML
80 INJECTION, SOLUTION INTRAMUSCULAR; INTRAVENOUS; SUBCUTANEOUS AS NEEDED
Status: DISCONTINUED | OUTPATIENT
Start: 2023-04-24 | End: 2023-04-24 | Stop reason: HOSPADM

## 2023-04-24 RX ORDER — HYDROMORPHONE HYDROCHLORIDE 1 MG/ML
0.2 INJECTION, SOLUTION INTRAMUSCULAR; INTRAVENOUS; SUBCUTANEOUS EVERY 2 HOUR PRN
Status: DISCONTINUED | OUTPATIENT
Start: 2023-04-24 | End: 2023-04-26

## 2023-04-24 RX ORDER — INDOCYANINE GREEN AND WATER 25 MG
KIT INJECTION AS NEEDED
Status: DISCONTINUED | OUTPATIENT
Start: 2023-04-24 | End: 2023-04-24 | Stop reason: SURG

## 2023-04-24 RX ORDER — HYDROMORPHONE HYDROCHLORIDE 1 MG/ML
0.8 INJECTION, SOLUTION INTRAMUSCULAR; INTRAVENOUS; SUBCUTANEOUS EVERY 2 HOUR PRN
Status: DISCONTINUED | OUTPATIENT
Start: 2023-04-24 | End: 2023-04-26

## 2023-04-24 RX ORDER — METOPROLOL TARTRATE 50 MG/1
50 TABLET, FILM COATED ORAL NIGHTLY
Status: DISCONTINUED | OUTPATIENT
Start: 2023-04-24 | End: 2023-04-26

## 2023-04-24 RX ORDER — ACETAMINOPHEN 500 MG
1000 TABLET ORAL EVERY 6 HOURS
Status: DISCONTINUED | OUTPATIENT
Start: 2023-04-24 | End: 2023-04-26

## 2023-04-24 RX ORDER — HEPARIN SODIUM 5000 [USP'U]/ML
5000 INJECTION, SOLUTION INTRAVENOUS; SUBCUTANEOUS
Status: COMPLETED | OUTPATIENT
Start: 2023-04-24 | End: 2023-04-24

## 2023-04-24 RX ORDER — DEXAMETHASONE SODIUM PHOSPHATE 4 MG/ML
VIAL (ML) INJECTION AS NEEDED
Status: DISCONTINUED | OUTPATIENT
Start: 2023-04-24 | End: 2023-04-24 | Stop reason: SURG

## 2023-04-24 RX ORDER — LIDOCAINE HYDROCHLORIDE AND EPINEPHRINE 10; 10 MG/ML; UG/ML
INJECTION, SOLUTION INFILTRATION; PERINEURAL AS NEEDED
Status: DISCONTINUED | OUTPATIENT
Start: 2023-04-24 | End: 2023-04-24 | Stop reason: HOSPADM

## 2023-04-24 RX ORDER — HYDROMORPHONE HYDROCHLORIDE 1 MG/ML
0.6 INJECTION, SOLUTION INTRAMUSCULAR; INTRAVENOUS; SUBCUTANEOUS EVERY 5 MIN PRN
Status: DISCONTINUED | OUTPATIENT
Start: 2023-04-24 | End: 2023-04-24 | Stop reason: HOSPADM

## 2023-04-24 RX ORDER — ONDANSETRON 2 MG/ML
INJECTION INTRAMUSCULAR; INTRAVENOUS AS NEEDED
Status: DISCONTINUED | OUTPATIENT
Start: 2023-04-24 | End: 2023-04-24 | Stop reason: SURG

## 2023-04-24 RX ADMIN — ROCURONIUM BROMIDE 100 MG: 10 INJECTION, SOLUTION INTRAVENOUS at 09:43:00

## 2023-04-24 RX ADMIN — SODIUM CHLORIDE, SODIUM LACTATE, POTASSIUM CHLORIDE, CALCIUM CHLORIDE: 600; 310; 30; 20 INJECTION, SOLUTION INTRAVENOUS at 09:39:00

## 2023-04-24 RX ADMIN — SODIUM CHLORIDE: 9 INJECTION, SOLUTION INTRAVENOUS at 12:39:00

## 2023-04-24 RX ADMIN — LIDOCAINE HYDROCHLORIDE 50 MG: 10 INJECTION, SOLUTION EPIDURAL; INFILTRATION; INTRACAUDAL; PERINEURAL at 09:43:00

## 2023-04-24 RX ADMIN — ONDANSETRON 4 MG: 2 INJECTION INTRAMUSCULAR; INTRAVENOUS at 12:18:00

## 2023-04-24 RX ADMIN — INDOCYANINE GREEN AND WATER 7.5 MG: 25 MG KIT INJECTION at 11:34:00

## 2023-04-24 RX ADMIN — SODIUM CHLORIDE: 9 INJECTION, SOLUTION INTRAVENOUS at 09:57:00

## 2023-04-24 RX ADMIN — LIDOCAINE HYDROCHLORIDE ANHYDROUS AND DEXTROSE MONOHYDRATE 1 MG/MIN: .8; 5 INJECTION, SOLUTION INTRAVENOUS at 09:43:00

## 2023-04-24 RX ADMIN — SODIUM CHLORIDE, SODIUM LACTATE, POTASSIUM CHLORIDE, CALCIUM CHLORIDE: 600; 310; 30; 20 INJECTION, SOLUTION INTRAVENOUS at 12:18:00

## 2023-04-24 RX ADMIN — METRONIDAZOLE 500 MG: 500 INJECTION, SOLUTION INTRAVENOUS at 10:05:00

## 2023-04-24 RX ADMIN — ROCURONIUM BROMIDE 10 MG: 10 INJECTION, SOLUTION INTRAVENOUS at 11:51:00

## 2023-04-24 RX ADMIN — MAGNESIUM SULFATE HEPTAHYDRATE 2 G: 500 INJECTION, SOLUTION INTRAMUSCULAR; INTRAVENOUS at 12:01:00

## 2023-04-24 RX ADMIN — SODIUM CHLORIDE, SODIUM LACTATE, POTASSIUM CHLORIDE, CALCIUM CHLORIDE: 600; 310; 30; 20 INJECTION, SOLUTION INTRAVENOUS at 10:49:00

## 2023-04-24 RX ADMIN — ROCURONIUM BROMIDE 20 MG: 10 INJECTION, SOLUTION INTRAVENOUS at 11:03:00

## 2023-04-24 RX ADMIN — DEXAMETHASONE SODIUM PHOSPHATE 8 MG: 4 MG/ML VIAL (ML) INJECTION at 09:57:00

## 2023-04-24 RX ADMIN — SODIUM CHLORIDE: 9 INJECTION, SOLUTION INTRAVENOUS at 11:36:00

## 2023-04-24 RX ADMIN — SODIUM CHLORIDE: 9 INJECTION, SOLUTION INTRAVENOUS at 12:18:00

## 2023-04-24 NOTE — RESPIRATORY THERAPY NOTE
RAIZA ASSESSMENT:    Pt does have a previous diagnosis of RAIZA. Pt does routinely use a CPAP device at home.  This pt is suspected to be at high risk for RAIZA    CPAP INITIATION:    Pt to be placed on CPAP: yes

## 2023-04-24 NOTE — ANESTHESIA PROCEDURE NOTES
Peripheral IV  Date/Time: 4/24/2023 9:55 AM  Inserted by: Zhang Hinojosa MD    Placement  Needle size: 18 G  Laterality: right  Location: forearm  Site prep: alcohol  Technique: anatomical landmarks  Attempts: 2

## 2023-04-24 NOTE — BRIEF OP NOTE
Pre-Operative Diagnosis: Malignant neoplasm of ascending colon ([facility] [address] [C18.2]     Post-Operative Diagnosis: Malignant neoplasm of ascending colon ([facility] [address] [C18.2]      Procedure Performed:   XI robot-assisted laparoscopic  right hemicolectomy    Surgeon(s) and Role:     * Catherine Jimenez MD - Primary    Assistant(s):  PA: Susie Delarosa, PGY4     Surgical Findings: pending patholgoy     Specimen: right hemicolectomy     Estimated Blood Loss: Blood Output: 50 mL (4/24/2023 12:19 PM)      Dictation Number:  Nicole CHRIS Fisher  4/24/2023  12:36 PM

## 2023-04-24 NOTE — ANESTHESIA PROCEDURE NOTES
Airway  Date/Time: 4/24/2023 9:49 AM  Urgency: Elective    Airway not difficult    General Information and Staff    Patient location during procedure: OR  Anesthesiologist: Emigdio Morataya MD  Performed: anesthesiologist   Performed by: Emigdio Morataya MD  Authorized by: Emigdio Morataya MD      Indications and Patient Condition  Indications for airway management: anesthesia  Sedation level: deep  Preoxygenated: yes  Patient position: sniffing  Mask difficulty assessment: 2 - vent by mask + OA or adjuvant +/- NMBA    Final Airway Details  Final airway type: endotracheal airway      Successful airway: ETT  Cuffed: yes   Successful intubation technique: Video laryngoscopy  Facilitating devices/methods: intubating stylet  Endotracheal tube insertion site: oral  Blade: Shahzad  Blade size: #4  ETT size (mm): 7.5    Cormack-Lehane Classification: grade IIA - partial view of glottis  Placement verified by: chest auscultation and capnometry   Measured from: teeth  ETT to teeth (cm): 23  Number of attempts at approach: 1

## 2023-04-24 NOTE — OPERATIVE REPORT
Date of the operation 4/24/2023    Preoperative diagnosis:  1. Ascending colonic adenocarcinoma    Operations performed:  1.  Robotic assisted right hemicolectomy with primary anastomosis  2. Omental pedicle flap    Postoperative diagnosis:  1. Same    Operating surgeon:  1. Rita Woodruff MD    Assistant:  1. NAV Stout    Indications:  63-year-old gentleman who was found to have an ascending colonic mass which biopsy was consistent with an adenocarcinoma. Staging studies were otherwise negative for metastatic disease. His case was reviewed in our multidisciplinary tumor board. Decision was made to proceed with surgical resection. He presents for that reason today. Details of the operation:  The patient was brought to the operating room and laid supine on the operating table. General endotracheal anesthesia was induced without complications. All pressure points were padded appropriately. The arms were tucked. A Bajwa's catheter was inserted under sterile conditions. A timeout was completed verifying the patient's name, procedure to be performed and the administration of antibiotics. Everybody in the room was in agreement. An incision was made with an 11 blade at Peralta's point to accommodate a Verres needle. Pneumoperitoneum was established. Additional 8 mm working ports were placed in a straight line from the left upper quadrant towards the left pubis. The second most cephalad port was exchanged to a 12 to accommodate a stapler and a left hemiabdominal 5 mm port was placed for an assistant. The robot was docked per usual.  Attention was directed towards the right colon. The mass was noted. Medial to lateral dissection commenced. The duodenum was identified and protected. The right ureter was identified and protected. The terminal ileum was mobilized off its attachments to the pelvis and peritoneum laterally. The hepatic flexure was taken down with the vessel sealer.   The colon was then mobilized along the line of Toldt. The ileocolonic pedicle was isolated and taken using a white load stapler. The right colic artery was identified and divided with the vessel sealer. Antimesenteric windows were created along the distal terminal ileum and proximal transverse colon. Blue load staplers were used to divide the intestines. I then performed an isoperistaltic side-to-side functional end-to-end anastomosis by firing of 60 mm blue load stapler across. The common enterotomy was closed in a running manner using 2 oh V-Loc. Hemostasis was excellent. The specimen was grasped. A portion of the greater omentum was made to lay adjacent to the anastomosis and was secured in place using the same 2 OV lock suture. A limited midline incision was made to extract the specimen. The wound protector was placed per usual.  Specimen was extracted oriented and sent off for final pathological analysis. The fascia of the extraction site was closed in a running manner using 0 Vicryl suture. The abdomen was reinsufflated, hemostasis was excellent. The fascia of the 12 mm port site was closed using 0 Vicryl suture using a Nadir-Yuni needle. The remainder of the ports were withdrawn under direct vision and there was no bleeding. The robot was undocked. The skin subcutaneous tissue were irrigated. The skin was approximated in a running manner/subcuticular using 4-0 Vicryl suture. The wounds were dressed. The patient was awakened and taken to the postoperative anesthesia care into the stable state. I was present for the entire case. Laura Wilkins MD  [facility]  [place] [id number]  Pager [phone]  [email or web address]

## 2023-04-25 LAB
ANION GAP SERPL CALC-SCNC: 8 MMOL/L (ref 0–18)
BUN BLD-MCNC: 15 MG/DL (ref 7–18)
BUN/CREAT SERPL: 13.3 (ref 10–20)
CALCIUM BLD-MCNC: 9.2 MG/DL (ref 8.5–10.1)
CHLORIDE SERPL-SCNC: 107 MMOL/L (ref 98–112)
CO2 SERPL-SCNC: 26 MMOL/L (ref 21–32)
CREAT BLD-MCNC: 1.13 MG/DL
DEPRECATED RDW RBC AUTO: 45 FL (ref 35.1–46.3)
ERYTHROCYTE [DISTWIDTH] IN BLOOD BY AUTOMATED COUNT: 14.8 % (ref 11–15)
GFR SERPLBLD BASED ON 1.73 SQ M-ARVRAT: 74 ML/MIN/1.73M2 (ref 60–?)
GLUCOSE BLD-MCNC: 136 MG/DL (ref 70–99)
HCT VFR BLD AUTO: 34.2 %
HGB BLD-MCNC: 11.1 G/DL
MAGNESIUM SERPL-MCNC: 2.1 MG/DL (ref 1.6–2.6)
MCH RBC QN AUTO: 26.7 PG (ref 26–34)
MCHC RBC AUTO-ENTMCNC: 32.5 G/DL (ref 31–37)
MCV RBC AUTO: 82.4 FL
OSMOLALITY SERPL CALC.SUM OF ELEC: 295 MOSM/KG (ref 275–295)
PHOSPHATE SERPL-MCNC: 3.2 MG/DL (ref 2.5–4.9)
PLATELET # BLD AUTO: 266 10(3)UL (ref 150–450)
POTASSIUM SERPL-SCNC: 4 MMOL/L (ref 3.5–5.1)
RBC # BLD AUTO: 4.15 X10(6)UL
SODIUM SERPL-SCNC: 141 MMOL/L (ref 136–145)
WBC # BLD AUTO: 11 X10(3) UL (ref 4–11)

## 2023-04-25 PROCEDURE — 99232 SBSQ HOSP IP/OBS MODERATE 35: CPT | Performed by: HOSPITALIST

## 2023-04-25 RX ORDER — DEXTROSE, SODIUM CHLORIDE, AND POTASSIUM CHLORIDE 5; .45; .15 G/100ML; G/100ML; G/100ML
INJECTION INTRAVENOUS CONTINUOUS
Status: DISCONTINUED | OUTPATIENT
Start: 2023-04-25 | End: 2023-04-26

## 2023-04-25 NOTE — PROGRESS NOTES
Patient received from PACU alert and oriented X4, vitals stable. Pain cpntrolled with PRN pain medication. Tolerated clear and full liquid diet. Denies nausea, vomiting. Continues with IVFs. Bajwa in place. 5 laps to abdomen. Oxygen via NC. cpap applied. Bed locked and in lowest position, call light within reach, calls appropriately for assistance.

## 2023-04-25 NOTE — PROGRESS NOTES
Occupational therapy orders received, chart review complete. Patient wit no skilled OT needs at this time, has been ambulating in hallway with family and no AD for balance. Going to and from restroom with no physical assist. Pt able to demo figure 4 technique with ease. Will DC orders, RN aware. Please re-order OT services if change in status occurs.        Denis Guardado OTR/L  [id number]  [facility]

## 2023-04-25 NOTE — PLAN OF CARE
[facility] is alert and oriented. On room air. CPAP at night. Remote tele in place. Q4 vitals; vital signs stable throughout shift. Tolerating full liquid diet, denies nausea. Denies passing gas. Pain managed with scheduled Tylenol and PRN Oxycodone. x5 lap sites to abdomen dry and intact, with scant amount of old drainage. IVF infusing. Bajwa in place. Strict I/Os. Ambulated in hallway. Call light within reach. Appropriate safety precautions maintained. Problem: PAIN - ADULT  Goal: Verbalizes/displays adequate comfort level or patient's stated pain goal  Description: INTERVENTIONS:  - Encourage pt to monitor pain and request assistance  - Assess pain using appropriate pain scale  - Administer analgesics based on type and severity of pain and evaluate response  - Implement non-pharmacological measures as appropriate and evaluate response  - Consider cultural and social influences on pain and pain management  - Manage/alleviate anxiety  - Utilize distraction and/or relaxation techniques  - Monitor for opioid side effects  - Notify MD/LIP if interventions unsuccessful or patient reports new pain  - Anticipate increased pain with activity and pre-medicate as appropriate  Outcome: Progressing     Problem: RISK FOR INFECTION - ADULT  Goal: Absence of fever/infection during anticipated neutropenic period  Description: INTERVENTIONS  - Monitor WBC  - Administer growth factors as ordered  - Implement neutropenic guidelines  Outcome: Progressing     Problem: SAFETY ADULT - FALL  Goal: Free from fall injury  Description: INTERVENTIONS:  - Assess pt frequently for physical needs  - Identify cognitive and physical deficits and behaviors that affect risk of falls.   - [place] fall precautions as indicated by assessment.  - Educate pt/family on patient safety including physical limitations  - Instruct pt to call for assistance with activity based on assessment  - Modify environment to reduce risk of injury  - Provide assistive devices as appropriate  - Consider OT/PT consult to assist with strengthening/mobility  - Encourage toileting schedule  Outcome: Progressing     Problem: DISCHARGE PLANNING  Goal: Discharge to home or other facility with appropriate resources  Description: INTERVENTIONS:  - Identify barriers to discharge w/pt and caregiver  - Include patient/family/discharge partner in discharge planning  - Arrange for needed discharge resources and transportation as appropriate  - Identify discharge learning needs (meds, wound care, etc)  - Arrange for interpreters to assist at discharge as needed  - Consider post-discharge preferences of patient/family/discharge partner  - Complete POLST form as appropriate  - Assess patient's ability to be responsible for managing their own health  - Refer to Case Management Department for coordinating discharge planning if the patient needs post-hospital services based on physician/LIP order or complex needs related to functional status, cognitive ability or social support system  Outcome: Progressing

## 2023-04-25 NOTE — PROGRESS NOTES
Patient alert and oriented X4, vitals stable. Pain controlled with PRN oxy and scheduled tylenol. Tolerating diet. Denies nausea, vomiting. Continues with IVFs, decreased rate. Bajwa removed and now voiding per urinal. 5 laps to abdomen. Room air during the day. cpap at night. Ambualting in room and lewis. Bed locked and in lowest position, call light within reach, calls appropriately for assistance.

## 2023-04-26 VITALS
SYSTOLIC BLOOD PRESSURE: 131 MMHG | HEIGHT: 72 IN | RESPIRATION RATE: 20 BRPM | WEIGHT: 285 LBS | DIASTOLIC BLOOD PRESSURE: 74 MMHG | BODY MASS INDEX: 38.6 KG/M2 | HEART RATE: 65 BPM | TEMPERATURE: 99 F | OXYGEN SATURATION: 98 %

## 2023-04-26 LAB
ANION GAP SERPL CALC-SCNC: 7 MMOL/L (ref 0–18)
BUN BLD-MCNC: 17 MG/DL (ref 7–18)
BUN/CREAT SERPL: 14.4 (ref 10–20)
CALCIUM BLD-MCNC: 9.1 MG/DL (ref 8.5–10.1)
CHLORIDE SERPL-SCNC: 107 MMOL/L (ref 98–112)
CO2 SERPL-SCNC: 28 MMOL/L (ref 21–32)
CREAT BLD-MCNC: 1.18 MG/DL
DEPRECATED RDW RBC AUTO: 45.7 FL (ref 35.1–46.3)
ERYTHROCYTE [DISTWIDTH] IN BLOOD BY AUTOMATED COUNT: 14.9 % (ref 11–15)
GFR SERPLBLD BASED ON 1.73 SQ M-ARVRAT: 71 ML/MIN/1.73M2 (ref 60–?)
GLUCOSE BLD-MCNC: 107 MG/DL (ref 70–99)
HCT VFR BLD AUTO: 34.2 %
HGB BLD-MCNC: 10.9 G/DL
MAGNESIUM SERPL-MCNC: 2.2 MG/DL (ref 1.6–2.6)
MCH RBC QN AUTO: 26.8 PG (ref 26–34)
MCHC RBC AUTO-ENTMCNC: 31.9 G/DL (ref 31–37)
MCV RBC AUTO: 84.2 FL
OSMOLALITY SERPL CALC.SUM OF ELEC: 296 MOSM/KG (ref 275–295)
PHOSPHATE SERPL-MCNC: 2.7 MG/DL (ref 2.5–4.9)
PLATELET # BLD AUTO: 252 10(3)UL (ref 150–450)
POTASSIUM SERPL-SCNC: 4.1 MMOL/L (ref 3.5–5.1)
RBC # BLD AUTO: 4.06 X10(6)UL
SODIUM SERPL-SCNC: 142 MMOL/L (ref 136–145)
WBC # BLD AUTO: 9.1 X10(3) UL (ref 4–11)

## 2023-04-26 RX ORDER — ENOXAPARIN SODIUM 100 MG/ML
40 INJECTION SUBCUTANEOUS DAILY
Qty: 10 ML | Refills: 0 | Status: SHIPPED | OUTPATIENT
Start: 2023-04-27 | End: 2023-05-22

## 2023-04-26 RX ORDER — TRAMADOL HYDROCHLORIDE 50 MG/1
TABLET ORAL EVERY 4 HOURS PRN
Qty: 20 TABLET | Refills: 0 | Status: SHIPPED | OUTPATIENT
Start: 2023-04-26

## 2023-04-26 NOTE — PLAN OF CARE
A/O x 4. Passing flatus. Tolerating low fiber diet. VSS. Up ad harpreet. Pain managed with tylenol. Abdominal incisions intact, scant old drainage. Ok to discharge home today. Discharge instructions explained to patient and wife at bedside. All medications reviewed including which medications to start, continue, or stop taking. All follow up appointments reviewed. All discharge eduation explained to pt. Lovenox administration reviewed with pt and wife, verbalized understanding of subcutaneous injection. Patient verbalized understanding, all questions answered. All belongings sent with patient.

## 2023-04-26 NOTE — PLAN OF CARE
Sammy Sal is alert and oriented. On room air. CPAP at night. Remote tele. Vital signs stable. Voiding freely. Denies passing gas. Tolerating soft diet, no complaints of nausea. Pain managed with scheduled Tylenol. IVF infusing. Lap sites to abdomen dry and intact with old drainage. Call light within reach. Appropriate safety precautions maintained. Problem: PAIN - ADULT  Goal: Verbalizes/displays adequate comfort level or patient's stated pain goal  Description: INTERVENTIONS:  - Encourage pt to monitor pain and request assistance  - Assess pain using appropriate pain scale  - Administer analgesics based on type and severity of pain and evaluate response  - Implement non-pharmacological measures as appropriate and evaluate response  - Consider cultural and social influences on pain and pain management  - Manage/alleviate anxiety  - Utilize distraction and/or relaxation techniques  - Monitor for opioid side effects  - Notify MD/LIP if interventions unsuccessful or patient reports new pain  - Anticipate increased pain with activity and pre-medicate as appropriate  Outcome: Progressing     Problem: RISK FOR INFECTION - ADULT  Goal: Absence of fever/infection during anticipated neutropenic period  Description: INTERVENTIONS  - Monitor WBC  - Administer growth factors as ordered  - Implement neutropenic guidelines  Outcome: Progressing     Problem: SAFETY ADULT - FALL  Goal: Free from fall injury  Description: INTERVENTIONS:  - Assess pt frequently for physical needs  - Identify cognitive and physical deficits and behaviors that affect risk of falls.   - [place] fall precautions as indicated by assessment.  - Educate pt/family on patient safety including physical limitations  - Instruct pt to call for assistance with activity based on assessment  - Modify environment to reduce risk of injury  - Provide assistive devices as appropriate  - Consider OT/PT consult to assist with strengthening/mobility  - Encourage toileting schedule  Outcome: Progressing     Problem: DISCHARGE PLANNING  Goal: Discharge to home or other facility with appropriate resources  Description: INTERVENTIONS:  - Identify barriers to discharge w/pt and caregiver  - Include patient/family/discharge partner in discharge planning  - Arrange for needed discharge resources and transportation as appropriate  - Identify discharge learning needs (meds, wound care, etc)  - Arrange for interpreters to assist at discharge as needed  - Consider post-discharge preferences of patient/family/discharge partner  - Complete POLST form as appropriate  - Assess patient's ability to be responsible for managing their own health  - Refer to Case Management Department for coordinating discharge planning if the patient needs post-hospital services based on physician/LIP order or complex needs related to functional status, cognitive ability or social support system  Outcome: Progressing

## 2023-04-27 ENCOUNTER — PATIENT OUTREACH (OUTPATIENT)
Dept: CASE MANAGEMENT | Age: 63
End: 2023-04-27

## 2023-04-27 ENCOUNTER — TELEPHONE (OUTPATIENT)
Dept: INTERNAL MEDICINE CLINIC | Facility: CLINIC | Age: 63
End: 2023-04-27

## 2023-04-27 ENCOUNTER — TELEPHONE (OUTPATIENT)
Dept: SURGERY | Facility: CLINIC | Age: 63
End: 2023-04-27

## 2023-04-27 DIAGNOSIS — Z02.9 ENCOUNTERS FOR UNSPECIFIED ADMINISTRATIVE PURPOSE: ICD-10-CM

## 2023-04-27 NOTE — TELEPHONE ENCOUNTER
Spoke to the pt today for TCM. The patient was recently hospitalized for a laparoscopic right hemicolectomy. The pt does not have a HFU appt scheduled at this time and declined scheduling when offered by the [facility]. The patient prefers to only follow up with Dr. Bucky Mccarty at this time. (An appointment is scheduled with the specialist office on 5/10/2023. A TCM/HFU appt is recommended by 5/10/2023 as the pt is a moderate risk for readmission. Please advise. BOOK BY DATE (last date for TCM): 5/10/2023      Please f/u with the pt and assist with scheduling a TCM/HFU appt. Thank you!

## 2023-04-27 NOTE — PROGRESS NOTES
Attempted to reach the patient to complete a [facility] FU call. Left a message for the pt to call the [facility] back at, [phone].

## 2023-04-28 NOTE — DISCHARGE SUMMARY
[facility]     Discharge Summary    Maribeth Nur Patient Status:  Inpatient    1960 MRN [id number]   Location [facility] 4W/SW/SE Attending No att. providers found   Hosp Day # 2 PCP Rojas Jarrell MD     Date of Admission: 2023    Date of Discharge: 2023     Admitting Diagnosis: Malignant neoplasm of ascending colon ([address] [C18.2]  Adenocarcinoma, colon [facility])    Discharge Diagnosis: Patient Active Problem List:     Essential hypertension     Obstructive sleep apnea     Dyslipidemia     Obesity     Chronic kidney disease, stage III (moderate) (HCC)     Subclinical hypothyroidism     Prediabetes     Morbid obesity ([address]     Primary adenocarcinoma of ascending colon ([address]     Aortic atherosclerosis ([address]     Adenocarcinoma, colon ([address]      Reason for Admission: planned procedure    Physical Exam:   General: NAD  Lungs: No respiratory distress  Heart: RRR  Abdomen: Soft, nondistended, anticipated tenderness for POD. Incisions cdi  Extremities: Warm, dry, no LE edema bilat  Neurological:  AAOx3, MAEW    History of Present Illness:  29-year-old gentleman who was found to have an ascending colonic mass which biopsy was consistent with an adenocarcinoma. Staging studies were otherwise negative for metastatic disease. His case was reviewed in our multidisciplinary tumor board. Decision was made to proceed with surgical resection. He presents for that reason today. Hospital Course: Post op course was uncomplicated. Patient tolerated advancement of diet and pain well controlled on oral pain medications prior to discharge. Patient was afebrile and voiding adequately prior to discharge. Verbal discharge instructions reviewed with patient prior to discharge home. Patient verbalized understanding. Patient discharged in stable condition. Consultations: PT, OT, hospitalist    Procedures:  1. Robotic assisted right hemicolectomy with primary anastomosis  2.   Omental pedicle flap    Complications: n/a    Disposition: Home or Self Care    Discharge Condition: Stable    Discharge Medications: Discharge Medication List as of 4/26/2023  1:25 PM    START taking these medications    enoxaparin 40 MG/0.4ML Injection Solution Prefilled Syringe  Inject 0.4 mL (40 mg total) into the skin daily for 25 days. , Normal, Disp-10 mL, R-0    traMADol 50 MG Oral Tab  Take 1-2 tablets ( mg total) by mouth every 4 (four) hours as needed for Pain., Normal, Disp-20 tablet, R-0      CONTINUE these medications which have NOT CHANGED    metoprolol tartrate 50 MG Oral Tab  Take 1 tablet (50 mg total) by mouth nightly., Normal, Disp-30 tablet, R-0    amLODIPine 5 MG Oral Tab  Take 1 tablet (5 mg total) by mouth daily. , Normal, Disp-30 tablet, R-0    valsartan 160 MG Oral Tab  Take 1 tablet (160 mg total) by mouth nightly., Normal, Disp-30 tablet, R-0    atorvastatin 10 MG Oral Tab  Take 1 tablet (10 mg total) by mouth nightly., Normal, Disp-30 tablet, R-0    ZINC OR  Use as directed in the mouth or throat. prn, Historical    FENOFIBRATE 160 MG Oral Tab  TAKE 1 TABLET BY MOUTH EVERY DAY, Normal, Disp-30 tablet, R-1    Multiple Vitamins-Minerals (MULTI-VITAMIN/MINERALS) Oral Tab  Take 1 tablet by mouth daily. , Historical      STOP taking these medications    polyethylene glycol, PEG 3350-KCl-NaBcb-NaCl-NaSulf, 236 g Oral Recon Soln    ciprofloxacin 250 MG Oral Tab    metRONIDAZOLE 500 MG Oral Tab          Follow up Visits: Our office will contact you to set up follow up appointment.     Princess Swetha PA-C  4/28/2023  2:14 PM

## 2023-04-29 DIAGNOSIS — E78.5 DYSLIPIDEMIA: ICD-10-CM

## 2023-04-29 RX ORDER — VALSARTAN 160 MG/1
160 TABLET ORAL NIGHTLY
Qty: 90 TABLET | Refills: 1 | Status: SHIPPED | OUTPATIENT
Start: 2023-04-29

## 2023-04-29 RX ORDER — ATORVASTATIN CALCIUM 10 MG/1
10 TABLET, FILM COATED ORAL NIGHTLY
Qty: 90 TABLET | Refills: 1 | Status: SHIPPED | OUTPATIENT
Start: 2023-04-29

## 2023-04-29 RX ORDER — METOPROLOL TARTRATE 50 MG/1
50 TABLET, FILM COATED ORAL NIGHTLY
Qty: 90 TABLET | Refills: 1 | Status: SHIPPED | OUTPATIENT
Start: 2023-04-29

## 2023-04-29 RX ORDER — FENOFIBRATE 160 MG/1
160 TABLET ORAL DAILY
Qty: 90 TABLET | Refills: 1 | Status: SHIPPED | OUTPATIENT
Start: 2023-04-29

## 2023-04-29 RX ORDER — AMLODIPINE BESYLATE 5 MG/1
5 TABLET ORAL DAILY
Qty: 90 TABLET | Refills: 1 | Status: SHIPPED | OUTPATIENT
Start: 2023-04-29

## 2023-05-04 ENCOUNTER — TELEPHONE (OUTPATIENT)
Dept: HEMATOLOGY/ONCOLOGY | Facility: HOSPITAL | Age: 63
End: 2023-05-04

## 2023-05-04 NOTE — TELEPHONE ENCOUNTER
Writer called patient and scheduled follow up for 5/10 with Dr. Jesus Edgar after appointment with Dr. Gauri Wood. Patient verbalized understanding and thanked writer for calling.

## 2023-05-10 ENCOUNTER — OFFICE VISIT (OUTPATIENT)
Dept: HEMATOLOGY/ONCOLOGY | Facility: HOSPITAL | Age: 63
End: 2023-05-10
Attending: STUDENT IN AN ORGANIZED HEALTH CARE EDUCATION/TRAINING PROGRAM
Payer: COMMERCIAL

## 2023-05-10 ENCOUNTER — OFFICE VISIT (OUTPATIENT)
Dept: SURGERY | Facility: CLINIC | Age: 63
End: 2023-05-10
Payer: COMMERCIAL

## 2023-05-10 VITALS
RESPIRATION RATE: 16 BRPM | SYSTOLIC BLOOD PRESSURE: 117 MMHG | OXYGEN SATURATION: 96 % | TEMPERATURE: 98 F | HEIGHT: 72 IN | HEART RATE: 66 BPM | WEIGHT: 287 LBS | BODY MASS INDEX: 38.87 KG/M2 | DIASTOLIC BLOOD PRESSURE: 73 MMHG

## 2023-05-10 VITALS
WEIGHT: 287 LBS | OXYGEN SATURATION: 96 % | SYSTOLIC BLOOD PRESSURE: 117 MMHG | BODY MASS INDEX: 38.87 KG/M2 | HEIGHT: 72 IN | DIASTOLIC BLOOD PRESSURE: 73 MMHG | HEART RATE: 66 BPM | RESPIRATION RATE: 16 BRPM | TEMPERATURE: 98 F

## 2023-05-10 DIAGNOSIS — C18.2 MALIGNANT NEOPLASM OF ASCENDING COLON (HCC): Primary | ICD-10-CM

## 2023-05-10 DIAGNOSIS — C18.2 PRIMARY ADENOCARCINOMA OF ASCENDING COLON (HCC): Primary | ICD-10-CM

## 2023-05-10 PROCEDURE — 3008F BODY MASS INDEX DOCD: CPT | Performed by: SURGERY

## 2023-05-10 PROCEDURE — 99213 OFFICE O/P EST LOW 20 MIN: CPT | Performed by: STUDENT IN AN ORGANIZED HEALTH CARE EDUCATION/TRAINING PROGRAM

## 2023-05-10 PROCEDURE — 3008F BODY MASS INDEX DOCD: CPT | Performed by: STUDENT IN AN ORGANIZED HEALTH CARE EDUCATION/TRAINING PROGRAM

## 2023-05-10 PROCEDURE — 3074F SYST BP LT 130 MM HG: CPT | Performed by: STUDENT IN AN ORGANIZED HEALTH CARE EDUCATION/TRAINING PROGRAM

## 2023-05-10 PROCEDURE — 99024 POSTOP FOLLOW-UP VISIT: CPT | Performed by: SURGERY

## 2023-05-10 PROCEDURE — 3074F SYST BP LT 130 MM HG: CPT | Performed by: SURGERY

## 2023-05-10 PROCEDURE — 3078F DIAST BP <80 MM HG: CPT | Performed by: SURGERY

## 2023-05-10 PROCEDURE — 3078F DIAST BP <80 MM HG: CPT | Performed by: STUDENT IN AN ORGANIZED HEALTH CARE EDUCATION/TRAINING PROGRAM

## 2023-05-11 ENCOUNTER — TELEPHONE (OUTPATIENT)
Dept: GASTROENTEROLOGY | Facility: CLINIC | Age: 63
End: 2023-05-11

## 2023-07-27 ENCOUNTER — TELEPHONE (OUTPATIENT)
Dept: GASTROENTEROLOGY | Facility: CLINIC | Age: 63
End: 2023-07-27

## 2023-10-23 ENCOUNTER — OFFICE VISIT (OUTPATIENT)
Dept: INTERNAL MEDICINE CLINIC | Facility: CLINIC | Age: 63
End: 2023-10-23

## 2023-10-23 VITALS
HEIGHT: 72 IN | SYSTOLIC BLOOD PRESSURE: 134 MMHG | HEART RATE: 80 BPM | BODY MASS INDEX: 41.29 KG/M2 | WEIGHT: 304.81 LBS | DIASTOLIC BLOOD PRESSURE: 66 MMHG

## 2023-10-23 DIAGNOSIS — N18.30 STAGE 3 CHRONIC KIDNEY DISEASE, UNSPECIFIED WHETHER STAGE 3A OR 3B CKD (HCC): ICD-10-CM

## 2023-10-23 DIAGNOSIS — E03.8 SUBCLINICAL HYPOTHYROIDISM: ICD-10-CM

## 2023-10-23 DIAGNOSIS — E66.01 MORBID OBESITY (HCC): ICD-10-CM

## 2023-10-23 DIAGNOSIS — Z00.00 ANNUAL PHYSICAL EXAM: Primary | ICD-10-CM

## 2023-10-23 DIAGNOSIS — C18.2 PRIMARY ADENOCARCINOMA OF ASCENDING COLON (HCC): ICD-10-CM

## 2023-10-23 DIAGNOSIS — R73.03 PREDIABETES: ICD-10-CM

## 2023-10-23 DIAGNOSIS — I10 ESSENTIAL HYPERTENSION: ICD-10-CM

## 2023-10-23 DIAGNOSIS — E78.5 DYSLIPIDEMIA: ICD-10-CM

## 2023-10-23 DIAGNOSIS — G47.33 OBSTRUCTIVE SLEEP APNEA: ICD-10-CM

## 2023-10-23 PROCEDURE — 3075F SYST BP GE 130 - 139MM HG: CPT | Performed by: INTERNAL MEDICINE

## 2023-10-23 PROCEDURE — 3078F DIAST BP <80 MM HG: CPT | Performed by: INTERNAL MEDICINE

## 2023-10-23 PROCEDURE — 99396 PREV VISIT EST AGE 40-64: CPT | Performed by: INTERNAL MEDICINE

## 2023-10-23 PROCEDURE — 3008F BODY MASS INDEX DOCD: CPT | Performed by: INTERNAL MEDICINE

## 2023-10-23 NOTE — PATIENT INSTRUCTIONS
Your blood pressure today was good at 134/66. Please continue current medication. Please try to follow a healthy diet and exercise regularly. Please schedule a physical with labs in 6 months.

## 2023-11-06 DIAGNOSIS — E78.5 DYSLIPIDEMIA: ICD-10-CM

## 2023-11-06 RX ORDER — ATORVASTATIN CALCIUM 10 MG/1
10 TABLET, FILM COATED ORAL NIGHTLY
Qty: 30 TABLET | Refills: 5 | Status: SHIPPED | OUTPATIENT
Start: 2023-11-06

## 2023-11-06 RX ORDER — METOPROLOL TARTRATE 50 MG/1
50 TABLET, FILM COATED ORAL NIGHTLY
Qty: 30 TABLET | Refills: 5 | Status: SHIPPED | OUTPATIENT
Start: 2023-11-06

## 2023-11-07 RX ORDER — AMLODIPINE BESYLATE 5 MG/1
5 TABLET ORAL DAILY
Qty: 90 TABLET | Refills: 1 | Status: SHIPPED | OUTPATIENT
Start: 2023-11-07

## 2023-11-07 RX ORDER — VALSARTAN 160 MG/1
160 TABLET ORAL NIGHTLY
Qty: 90 TABLET | Refills: 1 | Status: SHIPPED | OUTPATIENT
Start: 2023-11-07

## 2023-11-07 RX ORDER — FENOFIBRATE 160 MG/1
160 TABLET ORAL DAILY
Qty: 90 TABLET | Refills: 3 | Status: SHIPPED | OUTPATIENT
Start: 2023-11-07

## 2023-11-07 NOTE — TELEPHONE ENCOUNTER
Please review. Protocol failed / Has no protocol. Requested Prescriptions   Pending Prescriptions Disp Refills    valsartan 160 MG Oral Tab [Pharmacy Med Name: VALSARTAN 160 MG TABLET] 90 tablet 1     Sig: Take 1 tablet (160 mg total) by mouth nightly. Hypertensive Medications Protocol Failed - 11/7/2023 12:13 AM        Failed - CMP or BMP in past 6 months     No results found for this or any previous visit (from the past 4392 hour(s)).             Passed - In person appointment in the past 12 or next 3 months     Recent Outpatient Visits              2 weeks ago Annual physical exam    Dora Ambrose MD    Office Visit    6 months ago Primary adenocarcinoma of [facility])    [facility] Hematology Oncology Sarah Seaman DO    Office Visit    6 months ago Malignant neoplasm of [facility])    [address], [address] [facility], Perry Erwin MD    Office Visit    7 months ago Preoperative examination    Dora Ambrose MD    Office Visit    7 months ago Primary adenocarcinoma of [facility])    [facility] Hematology Oncology Sarah Seaman DO    Office Visit                      Passed - Last BP reading less than 140/90     BP Readings from Last 1 Encounters:   10/23/23 134/66               Passed - In person appointment or virtual visit in the past 6 months     Recent Outpatient Visits              2 weeks ago Annual physical exam    Dora Ambrose MD    Office Visit    6 months ago Primary adenocarcinoma of [facility])    [facility] Hematology Oncology Sarah Seaman DO    Office Visit    6 months ago Malignant neoplasm of ascending [facility])    [address], Maggie Jacob MD    Office Visit    7 months ago Preoperative examination    Tristan Castillo MD    Office Visit    7 months ago Primary adenocarcinoma of [facility] colon [facility])    [facility] Hematology Oncology Kamlesh Harden DO    Office Visit                      Passed [facility] or [facility] > 50     GFR Evaluation  EGFRCR: 71 , resulted on 4/26/2023            amLODIPine 5 MG Oral Tab [Pharmacy Med Name: AMLODIPINE BESYLATE 5 MG TAB] 90 tablet 1     Sig: Take 1 tablet (5 mg total) by mouth daily. Hypertensive Medications Protocol Failed - 11/7/2023 12:13 AM        Failed - CMP or BMP in past 6 months     No results found for this or any previous visit (from the past 4392 hour(s)).             Passed - In person appointment in the past 12 or next 3 months     Recent Outpatient Visits              2 weeks ago Annual physical exam    Tristan Castillo MD    Office Visit    6 months ago Primary adenocarcinoma of ascending colon [facility])    [facility] Hematology Oncology Kamlesh Harden DO    Office Visit    6 months ago Malignant neoplasm of ascending colon [facility])    Mitesh Wang, [address] [place] [address] Taurus Ramirez MD    Office Visit    7 months ago Preoperative examination    Tristan Castillo MD    Office Visit    7 months ago Primary adenocarcinoma of [facility] colon [facility])    [facility] Hematology Oncology Kamlesh Harden DO    Office Visit                      Passed - Last BP reading less than 140/90     BP Readings from Last 1 Encounters:   10/23/23 134/66               Passed - In person appointment or virtual visit in the past 6 months     Recent Outpatient Visits              2 weeks ago Annual physical exam    Sonya Hanson MD    Office Visit    6 months ago Primary adenocarcinoma of ascending colon [facility])    [facility] Hematology Oncology Kamlesh Harden DO    Office Visit    6 months ago Malignant neoplasm of ascending colon [facility])    [address] Yoon Brandt MD    Office Visit    7 months ago Preoperative examination    Tristan Castillo MD    Office Visit    7 months ago Primary adenocarcinoma of ascending colon [facility])    [facility] AND [facility] Hematology Oncology Kamlesh Harden DO    Office Visit                      Passed [facility] or [facility] > 50     GFR Evaluation  EGFRCR: 71 , resulted on 4/26/2023           Signed Prescriptions Disp Refills    Fenofibrate 160 MG Oral Tab 90 tablet 3     Sig: Take 1 tablet (160 mg total) by mouth daily.        Cholesterol Medication Protocol Passed - 11/7/2023 12:13 AM        Passed - ALT in past 12 months        Passed - LDL in past 12 months        Passed - Last ALT < 80     Lab Results   Component Value Date    ALT 33 04/12/2023             Passed - Last LDL < 130     Lab Results   Component Value Date    LDL 72 04/12/2023             Passed - In person appointment or virtual visit in the past 12 mos or appointment in next 3 mos     Recent Outpatient Visits              2 weeks ago Annual physical exam    Tristan Castillo MD    Office Visit    6 months ago Primary adenocarcinoma of ascending colon [facility])    [facility] Hematology Oncology Kamlesh Harden DO    Office Visit    6 months ago Malignant neoplasm of [facility] colon [facility])    Oral Aguilera MD    Office Visit    7 months ago Preoperative examination    Tristan Castillo MD    Office Visit    7 months ago Primary adenocarcinoma of ascending colon [facility])    [facility] Hematology Oncology Harley Cervantes,     Office Visit                            Recent Outpatient Visits              2 weeks ago Annual physical exam    Ronal Buckley MD    Office Visit    6 months ago Primary adenocarcinoma of ascending colon [facility])    [facility] Hematology Oncology Harley Cervantes DO    Office Visit    6 months ago Malignant neoplasm of ascending colon [facility])    Yamil Sorto, [address] [place] [address], Rodríguez Echeverria MD    Office Visit    7 months ago Preoperative examination    Ronal Buckley MD    Office Visit    7 months ago Primary adenocarcinoma of ascending colon [facility])    [facility] AND [facility] Hematology Oncology Harley Cervantes DO    Office Visit

## 2023-11-07 NOTE — TELEPHONE ENCOUNTER
Refill passed per [employer], [facility] protocol. Requested Prescriptions   Pending Prescriptions Disp Refills    FENOFIBRATE 160 MG Oral Tab [Pharmacy Med Name: FENOFIBRATE 160 MG TABLET] 30 tablet 5     Sig: TAKE 1 TABLET BY MOUTH EVERY DAY       Cholesterol Medication Protocol Passed - 11/7/2023 12:13 AM        Passed - ALT in past 12 months        Passed - LDL in past 12 months        Passed - Last ALT < 80     Lab Results   Component Value Date    ALT 33 04/12/2023             Passed - Last LDL < 130     Lab Results   Component Value Date    LDL 72 04/12/2023             Passed - In person appointment or virtual visit in the past 12 mos or appointment in next 3 mos     Recent Outpatient Visits              2 weeks ago Annual physical exam    Yoly Reinoso MD    Office Visit    6 months ago Primary adenocarcinoma of ascending colon [facility])    [facility] Oncology Tee Pelayo DO    Office Visit    6 months ago Malignant neoplasm of ascending colon [facility])    [address] [place] [address],[address], [address] [facility], [facility], Phong Garcia MD    Office Visit    7 months ago Preoperative examination    Yoly Reinoso MD    Office Visit    7 months ago Primary adenocarcinoma of ascending colon [facility])    [facility] Hematology Oncology Tee Pelayo DO    Office Visit                        VALSARTAN 160 MG Oral Tab [Pharmacy Med Name: VALSARTAN 160 MG TABLET] 30 tablet 5     Sig: Take 1 tablet (160 mg total) by mouth nightly. Hypertensive Medications Protocol Failed - 11/7/2023 12:13 AM        Failed - CMP or BMP in past 6 months     No results found for this or any previous visit (from the past 4392 hour(s)).             Passed - In person appointment in the past 12 or next 3 months     Recent Outpatient Visits              2 weeks ago Annual physical exam Mayi Ace MD    Office Visit    6 months ago Primary adenocarcinoma of ascending colon [facility])    [facility]    Office Visit    6 months ago Malignant neoplasm of [facility] colon [facility])    [address], [facility] [address] Patel Frost MD    Office Visit    7 months ago Preoperative examination    Mayi Ace MD    Office Visit    7 months ago Primary adenocarcinoma of [facility] colon [facility])    [facility] Hematology Oncology [facility] Betina, DO    Office Visit                      Passed - Last BP reading less than 140/90     BP Readings from Last 1 Encounters:   10/23/23 134/66               Passed - In person appointment or virtual visit in the past 6 months     Recent Outpatient Visits              2 weeks ago Annual physical exam    [address], [facility], Annetta Jalloh MD    Office Visit    6 months ago Primary adenocarcinoma of [facility] colon [facility])    [facility]    Office Visit    6 months ago Malignant neoplasm of ascending colon [facility])    [address], [address] [facility], [facility], Clarice Tim MD    Office Visit    7 months ago Preoperative examination    [address] [facility], Annetta Jalloh MD    Office Visit    7 months ago Primary adenocarcinoma of [facility] colon [facility])    [facility] Hematology Oncology Cassi DO Betina    Office Visit                      Passed [facility] or [facility] > 50     GFR Evaluation  EGFRCR: 71 , resulted on 4/26/2023            AMLODIPINE 5 MG Oral Tab [Pharmacy Med Name: AMLODIPINE BESYLATE 5 MG TAB] 30 tablet 5     Sig: Take 1 tablet (5 mg total) by mouth daily.        Hypertensive Medications Protocol Failed - 11/7/2023 12:13 AM        Failed - CMP or BMP in past 6 months     No results found for this or any previous visit (from the past 4392 hour(s)).             Passed - In person appointment in the past 12 or next 3 months     Recent Outpatient Visits              2 weeks ago Annual physical exam    Denise Edmonds MD    Office Visit    6 months ago Primary adenocarcinoma of [facility])    [facility] Hematology Oncology Jitendra Avila DO    Office Visit    6 months ago Malignant neoplasm of [facility])    [place] [facility] [place] Joni Quail, MD    Office Visit    7 months ago Preoperative examination    Denise Edmonds MD    Office Visit    7 months ago Primary adenocarcinoma of [facility])    [facility] Hematology Oncology Jitendra Avila DO    Office Visit                      Passed - Last BP reading less than 140/90     BP Readings from Last 1 Encounters:   10/23/23 134/66               Passed - In person appointment or virtual visit in the past 6 months     Recent Outpatient Visits              2 weeks ago Annual physical exam    Denise Edmonds MD    Office Visit    6 months ago Primary adenocarcinoma of [facility])    [facility] Hematology Oncology Jitendra Avila DO    Office Visit    6 months ago Malignant neoplasm of [facility])    [place]    Office Visit    7 months ago Preoperative examination    Denise Edmonds MD    Office Visit    7 months ago Primary adenocarcinoma of [facility])    [facility] Hematology Oncology Jitendra Avila DO    Office Visit                      Passed - EGFRCR or GFRNAA > 50     GFR Evaluation  EGFRCR: 71 , resulted on 4/26/2023               Recent Outpatient Visits              2 weeks ago Annual physical exam    Vivek Cedeno MD    Office Visit    6 months ago Primary adenocarcinoma of ascending colon [facility])    [facility] Hematology Oncology Lucasdave Galicia DO    Office Visit    6 months ago Malignant neoplasm of ascending colon [facility])    [address] [facility],[address] [facility], Aroldo Henry MD    Office Visit    7 months ago Preoperative examination    Vivek Cedeno MD    Office Visit    7 months ago Primary adenocarcinoma of ascending colon [facility])    [facility] Hematology Oncology Bishop Galicia,     Office Visit

## 2024-01-04 ENCOUNTER — TELEPHONE (OUTPATIENT)
Facility: CLINIC | Age: 64
End: 2024-01-04

## 2024-01-04 NOTE — TELEPHONE ENCOUNTER
Patient outreach message received:    Last colonoscopy done 3/22/23. 1 year recall placed into Pt Outreach, next due after surgical resection on 4/26/24 per Dr. Beach.     Recall reminder letter mailed out to patient.

## 2024-01-31 ENCOUNTER — TELEPHONE (OUTPATIENT)
Facility: CLINIC | Age: 64
End: 2024-01-31

## 2024-01-31 DIAGNOSIS — Z85.038 HISTORY OF COLON CANCER: ICD-10-CM

## 2024-01-31 DIAGNOSIS — Z12.11 SCREENING FOR COLON CANCER: ICD-10-CM

## 2024-01-31 DIAGNOSIS — Z90.49 S/P RIGHT HEMICOLECTOMY: Primary | ICD-10-CM

## 2024-01-31 NOTE — TELEPHONE ENCOUNTER
GI staff: please ask the patient to keep follow up with oncology or his primary care provider for surveillance of the kidney lesion noted on the CT scan last year.  He saw Dr. Yu from oncology last year and his PCP is Dr. Cervantes.     GI Staff:   Please schedule: CLN with MAC.     Split dose golytely bowel prep ordered.    Diagnosis: history of colon cancer s/p R hemicolectomy; CRC surveillance    Medication adjustments: none.

## 2024-01-31 NOTE — TELEPHONE ENCOUNTER
Last Procedure, Date, MD:  Colonoscopy, 3/22/23, Dr. Beach  Last Diagnosis:  ascending colon mass, colon polyps, colon diverticulosis, internal hemorrhoids    Recalled (mth/yrs):  1 year  Sedation Used Previously:  MAC  Last Prep Used (if known):  Split moviprep  Quality Of Prep (if known): Good  Anticoagulants: None  Diuretics: None  Diabetic Med's (PO/Injectables): None  Weight loss Med's: None  Iron/Herbal/Multivitamin Supplements (RX/OTC): Multivitamin  Marijuana/Vaping/CBD: None  Height & Weight: 6' and 300lb  BMI:  Hx of Cardiac/CVA Issues/(MI/Stroke): None  Devices Pacemaker/Defibrillator/Stents: None  Respiratory Issues/Oxygen Use/RAIZA/COPD: Sleep [place]  Issues w/ Anesthesia: none    Symptoms (Y/N): None  Symptoms Details:  None    Special Comments/Notes:    Please advise on orders and prep.     Thank you!

## 2024-02-01 NOTE — TELEPHONE ENCOUNTER
GI Schedulers,     Please assist pt with scheduling procedure, Colonoscopy per  below.    Thank you.

## 2024-02-01 NOTE — TELEPHONE ENCOUNTER
Called pt x2, left in his voicemail detailed message from Dr. Beach below.     GI office call back number provided if having questions.    Message sent in [employer]

## 2024-02-13 NOTE — TELEPHONE ENCOUNTER
----- Message from Jamal Jimenez MD sent at 2/13/2024 11:22 AM EST -----  Vit D is low, send 50k x 6 months  Other labs are fairly stable.     Health maintenance updated. Last colonoscopy done 3/22/23. 1 year recall placed into Pt Outreach, next due after surgical resection on 4/26/24 per Dr. Orlando Griffin.

## 2024-02-20 NOTE — TELEPHONE ENCOUNTER
Scheduled for:  Colonoscopy 428[address]  Provider Name:    Date:  7/17/2024  Location:  [facility]  Sedation:  MAC  Time:  11:15 am, (pt is aware to arrive at 10:15 am)  Prep:  Golytely  Meds/Allergies Reconciled?:  Physician reviewed  Diagnosis with codes: S/P Right hemicolectomy Z90.49, History of Colon Cancer Z85.038, Screening for Colon Cancer Z12.11  Was patient informed to call insurance with codes (Y/N): Yes    Referral sent?:  Referral was sent at the time of electronic surgical scheduling.  [facility] or [facility] notified?:   I sent an electronic request to Endo Scheduling and received a confirmation today.  Medication Orders:  Patient is aware to NOT take iron pills, herbal meds and diet supplements for 7 days before exam. Also to NOT take any form of alcohol, recreational drugs and any forms of ED meds 24-72 hours before exam.   Misc Orders:  N/A     Further instructions given by staff: I discussed the prep instructions with the patient which he verbally understood and is aware that I will mail the instructions today.

## 2024-04-25 DIAGNOSIS — E78.5 DYSLIPIDEMIA: ICD-10-CM

## 2024-04-26 ENCOUNTER — TELEPHONE (OUTPATIENT)
Dept: HEMATOLOGY/ONCOLOGY | Facility: HOSPITAL | Age: 64
End: 2024-04-26

## 2024-04-26 RX ORDER — AMLODIPINE BESYLATE 5 MG/1
5 TABLET ORAL DAILY
Qty: 90 TABLET | Refills: 0 | Status: SHIPPED | OUTPATIENT
Start: 2024-04-26

## 2024-04-26 RX ORDER — METOPROLOL TARTRATE 50 MG/1
50 TABLET, FILM COATED ORAL NIGHTLY
Qty: 90 TABLET | Refills: 0 | Status: SHIPPED | OUTPATIENT
Start: 2024-04-26

## 2024-04-26 RX ORDER — ATORVASTATIN CALCIUM 10 MG/1
10 TABLET, FILM COATED ORAL NIGHTLY
Qty: 90 TABLET | Refills: 0 | Status: SHIPPED | OUTPATIENT
Start: 2024-04-26

## 2024-04-26 RX ORDER — VALSARTAN 160 MG/1
160 TABLET ORAL NIGHTLY
Qty: 90 TABLET | Refills: 0 | Status: SHIPPED | OUTPATIENT
Start: 2024-04-26

## 2024-04-26 NOTE — TELEPHONE ENCOUNTER
Please review; protocol failed/ has no protocol      No active /future labs noted     Requested Prescriptions   Pending Prescriptions Disp Refills    METOPROLOL TARTRATE 50 MG Oral Tab [Pharmacy Med Name: METOPROLOL TARTRATE 50 MG TAB] 30 tablet 5     Sig: TAKE 1 TABLET BY MOUTH EVERY DAY AT NIGHT       Hypertension Medications Protocol Failed - 4/25/2024  4:36 PM        Failed - CMP or BMP in past 12 months        Passed - Last BP reading less than 140/90     BP Readings from Last 1 Encounters:   10/23/23 134/66               Passed - In person appointment or virtual visit in the past 12 mos or appointment in next 3 mos     Recent Outpatient Visits              6 months ago Annual physical exam    [facility] [place] Zeus Cervantes MD    Office Visit    11 months ago Primary adenocarcinoma of ascending colon (HCC)    [facility]    Office Visit    11 months ago Malignant neoplasm of ascending colon (HCC)    [facility] [place] Maximiliano Arias MD    Office Visit    1 year ago Preoperative examination    [facility] [place] [facility]    Office Visit    1 year ago Primary adenocarcinoma of ascending colon (HCC)    [facility]ematology Oncology [facility] peraza DO    Office Visit          Future Appointments         Provider Department Appt Notes    In 2 months MARSHALL MARIE [facility] Colonoscopy w/MAC @[facility]                    Passed - EGFRCR or GFRNAA > 50     GFR Evaluation              VALSARTAN 160 MG Oral Tab [Pharmacy Med Name: VALSARTAN 160 MG TABLET] 30 tablet 5     Sig: Take 1 tablet (160 mg total) by mouth nightly.       Hypertension Medications Protocol Failed - 4/25/2024  4:36 PM        Failed - CMP or BMP in past 12 months        Passed - Last BP reading less than 140/90     BP  Readings from Last 1 Encounters:   10/23/23 134/66               Passed - In person appointment or virtual visit in the past 12 mos or appointment in next 3 mos     Recent Outpatient Visits              6 months ago Annual physical exam    [facility], Zeus Gregory MD    Office Visit    11 months ago Primary adenocarcinoma of ascending colon (HCC)    [facility] Hematology Oncology [facility] peraza DO    Office Visit    11 months ago Malignant neoplasm of ascending colon (HCC)    [facility], [place] Maximiliano Arias MD    Office Visit    1 year ago Preoperative examination    [facility], Zeus Gregory MD    Office Visit    1 year ago Primary adenocarcinoma of ascending colon (HCC)    [facility]    Office Visit          Future Appointments         Provider Department Appt Notes    In 2 months CYNTHIA PROCEDURE [facility], [place] Colonoscopy w/MAC @[facility]                    Passed - EGFRCR or GFRNAA > 50     GFR Evaluation              AMLODIPINE 5 MG Oral Tab [Pharmacy Med Name: AMLODIPINE BESYLATE 5 MG TAB] 30 tablet 5     Sig: Take 1 tablet (5 mg total) by mouth daily.       Hypertension Medications Protocol Failed - 4/25/2024  4:36 PM        Failed - CMP or BMP in past 12 months        Passed - Last BP reading less than 140/90     BP Readings from Last 1 Encounters:   10/23/23 134/66               Passed - In person appointment or virtual visit in the past 12 mos or appointment in next 3 mos     Recent Outpatient Visits              6 months ago Annual physical exam    [facility], Zeus Gregory MD    Office Visit    11 months ago Primary adenocarcinoma of ascending colon (HCC)    [facility] Hematology Oncology [facility] peraza DO    Office  Visit    11 months ago Malignant neoplasm of ascending colon (HCC)    [facility], Maximiliano Puentes MD    Office Visit    1 year ago [facility]    Office Visit    1 year ago Primary adenocarcinoma of ascending colon (HCC)    [facility]     Office Visit          Future Appointments         Provider Department Appt Notes    In 2 months MARIE, PROCEDURE [facility] Colonoscopy w/MAC @[facility]                    Passed - EGFRCR or GFRNAA > 50     GFR Evaluation              ATORVASTATIN 10 MG Oral Tab [Pharmacy Med Name: ATORVASTATIN 10 MG TABLET] 30 tablet 5     Sig: TAKE 1 TABLET BY MOUTH EVERY DAY AT NIGHT       Cholesterol Medication Protocol Failed - 4/25/2024  4:36 PM        Failed - ALT < 80     Lab Results   Component Value Date    ALT 33 04/12/2023             Failed - ALT resulted within past year        Failed - Lipid panel within past 12 months     Lab Results   Component Value Date    CHOLEST 135 04/12/2023    TRIG 132 04/12/2023    HDL 40 04/12/2023    LDL 72 04/12/2023    VLDL 20 04/12/2023    NONHDLC 95 04/12/2023             Passed - In person appointment or virtual visit in the past 12 mos or appointment in next 3 mos     Recent Outpatient Visits              6 months ago Annual physical exam    [facility] Zeus Cervantes MD    Office Visit    11 months ago Primary adenocarcinoma of ascending colon (HCC)    [facility]     Office Visit    11 months ago Malignant neoplasm of ascending colon (HCC)    [facility], Maximiliano Puentes MD    Office Visit    1 year ago Preoperative examination    [facility]  Zeus Cervantes MD    Office Visit    1 year ago Primary adenocarcinoma of ascending colon (HCC)    [facility] Hematology Oncology [facility] peraza, DO    Office Visit          Future Appointments         Provider Department Appt Notes    In 2 months MARSHALL MARIE [facility] Colonoscopy w/MAC @[facility]                       Recent Outpatient Visits              6 months ago Annual physical exam    [facility] [place] Zeus Cervantes MD    Office Visit    11 months ago Primary adenocarcinoma of ascending colon (HCC)    [facility]     Office Visit    11 months ago Malignant neoplasm of ascending colon (HCC)    [facility], [place] Maximiliano Arias MD    Office Visit    1 year ago Preoperative examination    [facility] [place]    Office Visit    1 year ago Primary adenocarcinoma of ascending colon (HCC)    [facility] Hematology Oncology [facility] peraza, DO    Office Visit          Future Appointments         Provider Department Appt Notes    In 2 months MARSHALL MARIE [facility] Colonoscopy w/MAC @[facility]

## 2024-07-09 RX ORDER — POLYETHYLENE GLYCOL-3350 AND ELECTROLYTES 236; 6.74; 5.86; 2.97; 22.74 G/274.31G; G/274.31G; G/274.31G; G/274.31G; G/274.31G
4000 POWDER, FOR SOLUTION ORAL
COMMUNITY
Start: 2024-01-31 | End: 2024-07-17

## 2024-07-17 ENCOUNTER — TELEPHONE (OUTPATIENT)
Dept: GASTROENTEROLOGY | Facility: CLINIC | Age: 64
End: 2024-07-17

## 2024-07-17 ENCOUNTER — ANESTHESIA (OUTPATIENT)
Dept: ENDOSCOPY | Facility: HOSPITAL | Age: 64
End: 2024-07-17
Payer: COMMERCIAL

## 2024-07-17 ENCOUNTER — ANESTHESIA EVENT (OUTPATIENT)
Dept: ENDOSCOPY | Facility: HOSPITAL | Age: 64
End: 2024-07-17
Payer: COMMERCIAL

## 2024-07-17 ENCOUNTER — HOSPITAL ENCOUNTER (OUTPATIENT)
Facility: HOSPITAL | Age: 64
Setting detail: HOSPITAL OUTPATIENT SURGERY
Discharge: HOME | End: 2024-07-17
Attending: INTERNAL MEDICINE | Admitting: INTERNAL MEDICINE
Payer: COMMERCIAL

## 2024-07-17 VITALS
SYSTOLIC BLOOD PRESSURE: 110 MMHG | HEART RATE: 67 BPM | OXYGEN SATURATION: 95 % | RESPIRATION RATE: 18 BRPM | BODY MASS INDEX: 39.28 KG/M2 | HEIGHT: 72 IN | WEIGHT: 290 LBS | DIASTOLIC BLOOD PRESSURE: 56 MMHG

## 2024-07-17 DIAGNOSIS — Z85.038 HISTORY OF COLON CANCER: ICD-10-CM

## 2024-07-17 DIAGNOSIS — Z12.11 SCREENING FOR COLON CANCER: ICD-10-CM

## 2024-07-17 DIAGNOSIS — Z90.49 S/P RIGHT HEMICOLECTOMY: ICD-10-CM

## 2024-07-17 PROCEDURE — 0DBP8ZX EXCISION OF RECTUM, VIA NATURAL OR ARTIFICIAL OPENING ENDOSCOPIC, DIAGNOSTIC: ICD-10-PCS | Performed by: INTERNAL MEDICINE

## 2024-07-17 PROCEDURE — 45380 COLONOSCOPY AND BIOPSY: CPT | Performed by: INTERNAL MEDICINE

## 2024-07-17 RX ORDER — SODIUM CHLORIDE, SODIUM LACTATE, POTASSIUM CHLORIDE, CALCIUM CHLORIDE 600; 310; 30; 20 MG/100ML; MG/100ML; MG/100ML; MG/100ML
INJECTION, SOLUTION INTRAVENOUS CONTINUOUS
Status: DISCONTINUED | OUTPATIENT
Start: 2024-07-17 | End: 2024-07-17

## 2024-07-17 RX ORDER — ONDANSETRON 2 MG/ML
4 INJECTION INTRAMUSCULAR; INTRAVENOUS ONCE AS NEEDED
Status: DISCONTINUED | OUTPATIENT
Start: 2024-07-17 | End: 2024-07-17

## 2024-07-17 RX ORDER — LIDOCAINE HYDROCHLORIDE 10 MG/ML
INJECTION, SOLUTION EPIDURAL; INFILTRATION; INTRACAUDAL; PERINEURAL AS NEEDED
Status: DISCONTINUED | OUTPATIENT
Start: 2024-07-17 | End: 2024-07-17 | Stop reason: SURG

## 2024-07-17 RX ADMIN — LIDOCAINE HYDROCHLORIDE 50 MG: 10 INJECTION, SOLUTION EPIDURAL; INFILTRATION; INTRACAUDAL; PERINEURAL at 11:40:00

## 2024-07-17 RX ADMIN — SODIUM CHLORIDE, SODIUM LACTATE, POTASSIUM CHLORIDE, CALCIUM CHLORIDE: 600; 310; 30; 20 INJECTION, SOLUTION INTRAVENOUS at 11:40:00

## 2024-07-17 RX ADMIN — SODIUM CHLORIDE, SODIUM LACTATE, POTASSIUM CHLORIDE, CALCIUM CHLORIDE: 600; 310; 30; 20 INJECTION, SOLUTION INTRAVENOUS at 12:13:00

## 2024-07-17 NOTE — H&P
History & Physical Examination    Patient Name: Mushtaq Lee  MRN: [id number]  [facility]: [id number]  YOB: 1960    Diagnosis: history of ascending colon cancer s/p R hemicolectomy; CRC surveillance     Medications Prior to Admission   Medication Sig Dispense Refill Last Dose    GAVILYTE-G 236 g Oral Recon Soln Take 4,000 mL by mouth.       metoprolol tartrate 50 MG Oral Tab Take 1 tablet (50 mg total) by mouth nightly. 90 tablet 0     valsartan 160 MG Oral Tab Take 1 tablet (160 mg total) by mouth nightly. 90 tablet 0     amLODIPine 5 MG Oral Tab Take 1 tablet (5 mg total) by mouth daily. 90 tablet 0     atorvastatin 10 MG Oral Tab Take 1 tablet (10 mg total) by mouth nightly. 90 tablet 0     Fenofibrate 160 MG Oral Tab Take 1 tablet (160 mg total) by mouth daily. 90 tablet 3      No current facility-administered medications for this encounter.       Allergies: No Known Allergies    Past Medical History:    Aortic atherosclerosis (HCC)    CT scan 3-23    Chronic kidney disease, stage III (moderate) (HCC)    Dyslipidemia    Essential hypertension    Hearing impairment    Deaf in right ear    High blood pressure    High cholesterol    Morbid obesity (HCC)    Obstructive sleep apnea    On CPAP    Osteoarthritis    Prediabetes    Primary adenocarcinoma of ascending colon (HCC)    Invasive moderately differentiated adenocarcinoma with mucinous features (6.5 cm), grade 2; stage [id number]; s/p right hemicolectomy 4-23    Sleep apnea    Subclinical hypothyroidism     Past Surgical History:   Procedure Laterality Date    Colon surgery  04/24/2023    Right hemicolectomy    Colonoscopy N/A 03/22/2023    Procedure: COLONOSCOPY;  Surgeon: Nadja Beach MD;  Location: [facility] ENDOSCOPY    Oral care       Family History   Problem Relation Age of Onset    Other (T-cell Lymphoma) Father     Diabetes Mother     Other (Lymphoma) Brother      Social History     Tobacco Use    Smoking status: Former     Current packs/day:  0.00     Average packs/day: 1 pack/day for 30.0 years (30.0 ttl pk-yrs)     Types: Cigarettes     Start date: 1982     Quit date: 2012     Years since quittin.0    Smokeless tobacco: Never   Substance Use Topics    Alcohol use: Yes     Alcohol/week: 2.0 - 4.0 standard drinks of alcohol     Types: 2 - 4 Standard drinks or equivalent per week     Comment: 2-4 drinks 4 days weekly         SYSTEM Check if Review is Normal Check if Physical Exam is Normal If not normal, please explain:   HEENT [X ] [ X]    NECK  [X ] [ X]    HEART [X ] [ X]    LUNGS [X ] [ X]    ABDOMEN [X ] [ X]    EXTREMITIES [X ] [ X]    OTHER        I have discussed the risks and benefits and alternatives of the procedure with the patient/family.  They understand and agree to proceed with plan of care.   I have reviewed the History and Physical done within the last 30 days.  Any changes noted above.    Nadja Beach MD  [facility] - Gastroenterology  2024

## 2024-07-17 NOTE — DISCHARGE INSTRUCTIONS
Home Care Instructions for Colonoscopy with Sedation    Diet:  - Resume your regular diet as tolerated unless otherwise instructed.  - Start with light meals to minimize bloating.  - Do not drink alcohol today.    Medication:  - If you have questions about resuming your normal medications, please contact your Primary Care Physician.    Activities:  - Take it easy today. Do not return to work today.  - Do not drive today.  - Do not operate any machinery today (including kitchen equipment).    Colonoscopy:  - You may notice some rectal \"spotting\" (a little blood on the toilet tissue) for a day or two after the exam. This is normal.  - If you experience any rectal bleeding (not spotting), persistent tenderness or sharp severe abdominal pains, oral temperature over 100 degrees Fahrenheit, light-headedness or dizziness, or any other problems, contact your doctor.    **If unable to reach your doctor, please go to the [facility] Emergency Room**    - Your referring physician will receive a full report of your examination.  - If you do not hear from your doctor's office within two weeks of your biopsy, please call them for your results.    You may be able to see your laboratory results in Med ePad between 4 and 7 business days.  In some cases, your physician may not have viewed the results before they are released to Med ePad.  If you have questions regarding your results contact the physician who ordered the test/exam by phone or via Med ePad by choosing \"Ask a Medical Question.\"

## 2024-07-17 NOTE — ANESTHESIA POSTPROCEDURE EVALUATION
Patient: Mushtaq Lee    Procedure Summary       Date: 07/17/24 Room / Location: [facility] ENDOSCOPY 04 / [facility] ENDOSCOPY    Anesthesia Start: 1138 Anesthesia Stop:     Procedure: COLONOSCOPY Diagnosis:       S/P right hemicolectomy      History of colon cancer      Screening for colon cancer      (healthy appearing anastamosis, colon polyp, hemorrhoids)    Surgeons: Nadja Beach MD Anesthesiologist: Adeola Westbrook CRNA    Anesthesia Type: general ASA Status: 3            Anesthesia Type: general    Vitals Value Taken Time   /55 07/17/24 1211   Temp  07/17/24 1214   Pulse 79 07/17/24 1211   Resp 12 07/17/24 1211   SpO2 96 % 07/17/24 1211       [facility] AN Post Evaluation:   Patient Evaluated in PACU  Patient Participation: complete - patient participated  Level of Consciousness: sleepy but conscious  Pain Score: 0  Pain Management: adequate  Airway Patency:patent  Yes    Nausea/Vomiting: none  Cardiovascular Status: hemodynamically stable  Respiratory Status: room air  Postoperative Hydration acceptable      Adeola Westbrook CRNA  7/17/2024 12:14 PM

## 2024-07-17 NOTE — ANESTHESIA PREPROCEDURE EVALUATION
Anesthesia PreOp Note    HPI:     Mushtaq Lee is a 63 year old male who presents for preoperative consultation requested by: Nadja Beach MD    Date of Surgery: 7/17/2024    Procedure(s):  COLONOSCOPY  Indication: S/P right hemicolectomy / History of colon cancer / Screening for colon cancer    Relevant Problems   No relevant active problems       NPO:                         History Review:  Patient Active Problem List    Diagnosis Date Noted    Adenocarcinoma, colon (HCC) 04/24/2023    Aortic atherosclerosis (HCC) 04/08/2023    Primary adenocarcinoma of ascending colon (HCC) 03/29/2023    Morbid obesity (HCC)     Prediabetes     Subclinical hypothyroidism 10/01/2018    Chronic kidney disease, stage III (moderate) (HCC)     Essential hypertension 07/17/2017    Obstructive sleep apnea 07/17/2017    Dyslipidemia 07/17/2017    Obesity 07/17/2017       Past Medical History:    Aortic atherosclerosis (HCC)    CT scan 3-23    Chronic kidney disease, stage III (moderate) (HCC)    Dyslipidemia    Essential hypertension    Hearing impairment    Deaf in right ear    High blood pressure    High cholesterol    Morbid obesity (HCC)    Obstructive sleep apnea    On CPAP    Osteoarthritis    Prediabetes    Primary adenocarcinoma of ascending colon (HCC)    Invasive moderately differentiated adenocarcinoma with mucinous features (6.5 cm), grade 2; stage [id number]; s/p right hemicolectomy 4-23    Sleep apnea    Subclinical hypothyroidism       Past Surgical History:   Procedure Laterality Date    Colon surgery  04/24/2023    Right hemicolectomy    Colonoscopy N/A 03/22/2023    Procedure: COLONOSCOPY;  Surgeon: Nadja Beach MD;  [facility]: [facility] ENDOSCOPY    Oral care         Medications Prior to Admission   Medication Sig Dispense Refill Last Dose    GAVILYTE-G 236 g Oral Recon Soln Take 4,000 mL by mouth.       metoprolol tartrate 50 MG Oral Tab Take 1 tablet (50 mg total) by mouth nightly. 90 tablet 0     valsartan 160 MG  Oral Tab Take 1 tablet (160 mg total) by mouth nightly. 90 tablet 0     amLODIPine 5 MG Oral Tab Take 1 tablet (5 mg total) by mouth daily. 90 tablet 0     atorvastatin 10 MG Oral Tab Take 1 tablet (10 mg total) by mouth nightly. 90 tablet 0     Fenofibrate 160 MG Oral Tab Take 1 tablet (160 mg total) by mouth daily. 90 tablet 3      No current Epic-ordered facility-administered medications on file.     No current Epic-ordered outpatient medications on file.       No Known Allergies    Family History   Problem Relation Age of Onset    Other (T-cell Lymphoma) Father     Diabetes Mother     Other (Lymphoma) Brother      Social History     Socioeconomic History    Marital status:    Occupational History    Occupation: [employer]   Tobacco Use    Smoking status: Former     Current packs/day: 0.00     Average packs/day: 1 pack/day for 30.0 years (30.0 ttl pk-yrs)     Types: Cigarettes     Start date: 1982     Quit date: 2012     Years since quittin.0    Smokeless tobacco: Never   Vaping Use    Vaping status: Never Used   Substance and Sexual Activity    Alcohol use: Yes     Alcohol/week: 2.0 - 4.0 standard drinks of alcohol     Types: 2 - 4 Standard drinks or equivalent per week     Comment: 2-4 drinks 4 days weekly    Drug use: Yes     Types: Cannabis     Comment: smoking flower X1 a month       Available pre-op labs reviewed.             Vital Signs:  Body mass index is 39.33 kg/m².   height is 1.829 m (6') and weight is 131.5 kg (290 lb).   Vitals:    24 1345   Weight: 131.5 kg (290 lb)   Height: 1.829 m (6')        Anesthesia Evaluation     Patient summary reviewed and Nursing notes reviewed    No history of anesthetic complications   Airway   Mallampati: III  TM distance: >3 FB  Neck ROM: full  Comment: Increased adipose tissue  Dental      Pulmonary     breath sounds clear to auscultation  (+) sleep apnea on CPAP    ROS comment: Former smoker, quit in . Denies any chronic cough/sob   Cardiovascular   (+) hypertension well controlled    Rhythm: regular  Rate: normal  ROS comment: Denies any recent chest or sob    Neuro/Psych - negative ROS     GI/Hepatic/Renal    (+) chronic renal disease (stage III) CRI    Comments: H/o colon cancer s/p surgery    Endo/Other    (+) diabetes mellitus (pre-diabetes), hypothyroidism  Abdominal   (+) obese                 Anesthesia Plan:   ASA:  3  Plan:   General  Informed Consent Plan and Risks Discussed With:  Patient      I have informed Mushtaq Lee and/or legal guardian or family member of the nature of the anesthetic plan, benefits, risks including possible dental damage if relevant, major complications, and any alternative forms of anesthetic management.   All of the patient's questions were answered to the best of my ability. The patient desires the anesthetic management as planned.  Adeola Westbrook CRNA  7/17/2024 10:25 AM  Present on Admission:  **None**

## 2024-07-17 NOTE — OPERATIVE REPORT
COLONOSCOPY REPORT    Mushtaq Lee     1960 Age 63 year old   PCP Zeus Cervantes MD Endoscopist Nadja Beach MD     Date of procedure: 24    Procedure: Colonoscopy w/ cold biopsy    Pre-operative diagnosis: history of ascending colon cancer s/p R hemicolectomy; CRC surveillance     Post-operative diagnosis: rectal polyp, mild sigmoid colon diverticulosis, internal hemorrhoids    Medications: MAC    Withdrawal time: 18 minutes    Procedure:  Informed consent was obtained from the patient after the risks of the procedure were discussed, including but not limited to bleeding, perforation, aspiration, infection, or possibility of a missed lesion. After discussions of the risks/benefits and alternatives to this procedure, as well as the planned sedation, the patient was placed in the left lateral decubitus position and begun on continuous blood pressure pulse oximetry and EKG monitoring and this was maintained throughout the procedure. Once an adequate level of sedation was obtained a digital rectal exam was completed. Then the lubricated tip of the [id number] diagnostic video colonoscope was inserted and advanced without difficulty to the ileo-colonic anastomosis using the CO2 insufflation technique. Withdrawal was begun with thorough washing and careful examination of the colonic walls and folds. A routine second examination of the cecum/ascending colon was performed. Photodocumentation was obtained. The bowel prep was good. Views of the colon were good with washing. I then carefully withdrew the instrument from the patient who tolerated the procedure well.     Complications: none.    Findings:   1. 1 polyp noted as follows:      A. 2 mm polyp in the rectum; sessile morphology; completely removed by cold forceps biopsies and retrieved.    2. Diverticulosis: mild in the sigmoid colon.    3. Terminal ileum: the visualized mucosa appeared normal.    4. A retroflexed view of the rectum  revealed small internal hemorrhoids.    5. The ileo-colonic anastomosis appeared unremarkable. The colonic mucosa throughout the colon was carefully examined and showed normal vascular pattern, without evidence of angioectasias or inflammation.     6. BRENDON: normal rectal tone, no masses palpated.     Impression:   The ileo-colonic anastomosis appeared unremarkable.  One small rectal polyp, resected and retrieved.  Mild sigmoid colon diverticulosis.  Small internal hemorrhoids.     Recommend:  Await pathology.   Repeat colonoscopy is recommended in 3 years. If new signs or symptoms develop, colonoscopy may need to be repeated sooner.   High fiber diet.  Monitor for blood in the stool. If having more than just tinge of blood, call office or go to the ER.    >>>If tissue was obtained and you have not received your pathology results either by phone or letter within 2 weeks, please call our office at [phone].    Specimens: rectal polyp    Blood loss: <1 ml

## 2024-08-04 DIAGNOSIS — E78.5 DYSLIPIDEMIA: ICD-10-CM

## 2024-08-07 RX ORDER — METOPROLOL TARTRATE 50 MG
50 TABLET ORAL NIGHTLY
Qty: 30 TABLET | Refills: 0 | Status: SHIPPED | OUTPATIENT
Start: 2024-08-07 | End: 2024-08-09

## 2024-08-07 RX ORDER — AMLODIPINE BESYLATE 5 MG/1
5 TABLET ORAL DAILY
Qty: 30 TABLET | Refills: 0 | Status: SHIPPED | OUTPATIENT
Start: 2024-08-07 | End: 2024-08-09

## 2024-08-07 RX ORDER — VALSARTAN 160 MG/1
160 TABLET ORAL NIGHTLY
Qty: 30 TABLET | Refills: 0 | Status: SHIPPED | OUTPATIENT
Start: 2024-08-07 | End: 2024-08-09

## 2024-08-07 RX ORDER — ATORVASTATIN CALCIUM 10 MG/1
10 TABLET, FILM COATED ORAL NIGHTLY
Qty: 30 TABLET | Refills: 0 | Status: SHIPPED | OUTPATIENT
Start: 2024-08-07 | End: 2024-08-09

## 2024-08-07 NOTE — TELEPHONE ENCOUNTER
Please review. Protocol Failed; No Protocol    Requested Prescriptions   Pending Prescriptions Disp Refills    amLODIPine 5 MG Oral Tab 90 tablet 0     Sig: Take 1 tablet (5 mg total) by mouth daily.       Hypertension Medications Protocol Failed - 8/7/2024 10:55 AM        Failed - CMP or BMP in past 12 months        Failed - EGFRCR or GFRNAA > 50     GFR Evaluation            Passed - Last BP reading less than 140/90     BP Readings from Last 1 Encounters:   07/17/24 110/56               Passed - In person appointment or virtual visit in the past 12 mos or appointment in next 3 mos     Recent Outpatient Visits              9 months ago [facility]    [facility]    1 year ago Primary adenocarcinoma of ascending colon (HCC)    [facility]    Office Visit    1 year ago Malignant neoplasm of ascending colon (HCC)    [facility], Maximiliano Puentes MD    Office Visit    1 year ago [facility]operative examination    [facility]    Office Visit    1 year ago Primary adenocarcinoma of ascending colon (HCC)    [facility]     Office Visit                               Recent Outpatient Visits              9 months ago Annual physical exam    [facility]    [facility] Visit    1 year ago Primary adenocarcinoma of ascending colon (HCC)    [facility]    Office Visit    1 year ago Malignant neoplasm of ascending colon (HCC)    [facility], Maximiliano Puentes MD    Office Visit    1 year ago [facility]operative examination    [facility]     Office Visit    1 year ago Primary adenocarcinoma of ascending colon (HCC)    [facility] Hematology Oncology Terrell Yu, DO    Office Visit

## 2024-08-07 NOTE — TELEPHONE ENCOUNTER
Patient called and is requesting refill, patient completely out     [id number] - [place], [place] - [address] AT [address] [facility],     Medication Detail    Medication Quantity Refills Start End   metoprolol tartrate 50 MG Oral Tab 90 tablet 0 4/26/2024 --   Sig:   Take 1 tablet (50 mg total) by mouth nightly.     Route:   Oral     Order #:   [id number]       Medication Detail    Medication Quantity Refills Start End   valsartan 160 MG Oral Tab 90 tablet 0 4/26/2024 --   Sig:   Take 1 tablet (160 mg total) by mouth nightly.     Route:   Oral     Order #:   [id number]     Medication Detail    Medication Quantity Refills Start End   amLODIPine 5 MG Oral Tab 90 tablet 0 4/26/2024 --   Sig:   Take 1 tablet (5 mg total) by mouth daily.     Route:   Oral     Order #:   [id number]       Medication Detail    Medication Quantity Refills Start End   atorvastatin 10 MG Oral Tab 90 tablet 0 4/26/2024 --   Sig:   Take 1 tablet (10 mg total) by mouth nightly.     Route:   Oral     Order #:   [id number]

## 2024-08-07 NOTE — TELEPHONE ENCOUNTER
Please review; protocol failed/No Protocol    Last Office Visit: 10/23/2023    No Active/Future labs pended     Requested Prescriptions   Pending Prescriptions Disp Refills    VALSARTAN 160 MG Oral Tab [Pharmacy Med Name: VALSARTAN 160 MG TABLET] 30 tablet 2     Sig: Take 1 tablet (160 mg total) by mouth nightly.       Hypertension Medications Protocol Failed - 8/4/2024 10:38 AM        Failed - CMP or BMP in past 12 months        Failed - EGFRCR or GFRNAA > 50     GFR Evaluation            Passed - Last BP reading less than 140/90     BP Readings from Last 1 Encounters:   07/17/24 110/56               Passed - In person appointment or virtual visit in the past 12 mos or appointment in next 3 mos     Recent Outpatient Visits              9 months ago Annual physical exam    [facility], [facility], Zeus Gregory MD    Office Visit    1 year ago Primary adenocarcinoma of ascending colon (HCC)    [facility]    Office Visit    1 year ago Malignant neoplasm of ascending colon (HCC)    [facility], [facility], Maximiliano Puentes MD    Office Visit    1 year ago Preoperative examination    [facility], [facility], Zeus Gregory MD    Office Visit    1 year ago Primary adenocarcinoma of ascending colon (HCC)    [facility]    Office Visit                        ATORVASTATIN 10 MG Oral Tab [Pharmacy Med Name: ATORVASTATIN 10 MG TABLET] 30 tablet 2     Sig: TAKE 1 TABLET BY MOUTH EVERY DAY AT NIGHT       Cholesterol Medication Protocol Failed - 8/4/2024 10:38 AM        Failed - ALT < 80     Lab Results   Component Value Date    ALT 33 04/12/2023             Failed - ALT resulted within past year        Failed - Lipid panel within past 12 months     Lab Results   Component Value Date    CHOLEST 135 04/12/2023    TRIG 132 04/12/2023     HDL 40 04/12/2023    LDL 72 04/12/2023    VLDL 20 04/12/2023    NONHDLC 95 04/12/2023             Passed - In person appointment or virtual visit in the past 12 mos or appointment in next 3 mos     Recent Outpatient Visits              9 months ago Annual physical exam    [facility], [facility], Zeus Gregory MD    Office Visit    1 year ago Primary adenocarcinoma of ascending colon (HCC)    [facility]     Office Visit    1 year ago Malignant neoplasm of ascending colon (HCC)    [facility] [place] Maximiliano Arias MD    Office Visit    1 year ago Preoperative examination    [facility], Zeus Gregory MD    Office Visit    1 year ago Primary adenocarcinoma of ascending colon (HCC)    [facility]     Office Visit                        METOPROLOL TARTRATE 50 MG Oral Tab [Pharmacy Med Name: METOPROLOL TARTRATE 50 MG TAB] 30 tablet 2     Sig: TAKE 1 TABLET BY MOUTH EVERY DAY AT NIGHT       Hypertension Medications Protocol Failed - 8/4/2024 10:38 AM        Failed - CMP or BMP in past 12 months        Failed - EGFRCR or GFRNAA > 50     GFR Evaluation            Passed - Last BP reading less than 140/90     BP Readings from Last 1 Encounters:   07/17/24 110/56               Passed - In person appointment or virtual visit in the past 12 mos or appointment in next 3 mos     Recent Outpatient Visits              9 months ago Annual physical exam    [facility], Zeus Gregory MD    Office Visit    1 year ago Primary adenocarcinoma of ascending colon (HCC)    [facility]     Office Visit    1 year ago Malignant neoplasm of ascending colon (HCC)    [facility] Maximiliano    Office Visit    1 year ago Preoperative examination    [facility] [place]    Office Visit    1 year ago Primary adenocarcinoma of ascending colon (HCC)    [facility]     Office Visit                           Recent Outpatient Visits              9 months ago Annual physical exam    [facility], [facility], Zeus Gregory MD    Office Visit    1 year ago Primary adenocarcinoma of ascending colon (HCC)    [facility]     Office Visit    1 year ago Malignant neoplasm of ascending colon (HCC)    [facility], [facility] [place] Maximiliano Arias MD    Office Visit    1 year ago Preoperative examination    [facility], Zeus Gregory MD    Office Visit    1 year ago Primary adenocarcinoma of ascending colon (HCC)    [facility] Hematology Oncology [facility] peraza,     Office Visit

## 2024-08-09 ENCOUNTER — TELEPHONE (OUTPATIENT)
Dept: INTERNAL MEDICINE CLINIC | Facility: CLINIC | Age: 64
End: 2024-08-09

## 2024-08-09 DIAGNOSIS — E78.5 DYSLIPIDEMIA: ICD-10-CM

## 2024-08-09 RX ORDER — METOPROLOL TARTRATE 50 MG/1
50 TABLET, FILM COATED ORAL NIGHTLY
Qty: 30 TABLET | Refills: 0 | Status: SHIPPED | OUTPATIENT
Start: 2024-08-09

## 2024-08-09 RX ORDER — VALSARTAN 160 MG/1
160 TABLET ORAL NIGHTLY
Qty: 30 TABLET | Refills: 0 | Status: SHIPPED | OUTPATIENT
Start: 2024-08-09

## 2024-08-09 RX ORDER — ATORVASTATIN CALCIUM 10 MG/1
10 TABLET, FILM COATED ORAL NIGHTLY
Qty: 30 TABLET | Refills: 0 | Status: SHIPPED | OUTPATIENT
Start: 2024-08-09

## 2024-08-09 RX ORDER — AMLODIPINE BESYLATE 5 MG/1
5 TABLET ORAL DAILY
Qty: 30 TABLET | Refills: 0 | Status: SHIPPED | OUTPATIENT
Start: 2024-08-09

## 2024-08-09 NOTE — TELEPHONE ENCOUNTER
[facility] [place].    [facility] [place] [facility]     Updated patient.

## 2024-08-09 NOTE — TELEPHONE ENCOUNTER
Patient calling to state valsartan should have gone to [facility] in [place], stated is completley out.

## 2024-08-30 NOTE — TELEPHONE ENCOUNTER
Current Outpatient Medications:       amLODIPine 5 MG Oral Tab, Take 1 tablet (5 mg total) by mouth daily., Disp: 30 tablet, Rfl: 0

## 2024-09-03 DIAGNOSIS — E78.5 DYSLIPIDEMIA: ICD-10-CM

## 2024-09-03 RX ORDER — AMLODIPINE BESYLATE 5 MG/1
5 TABLET ORAL DAILY
Qty: 90 TABLET | Refills: 0 | Status: SHIPPED | OUTPATIENT
Start: 2024-09-03 | End: 2024-09-05

## 2024-09-03 NOTE — TELEPHONE ENCOUNTER
Please review. Protocol Failed; No Protocol    Future Appointments   Date Time Provider Department Center   11/4/2024  5:00 PM Zeus Cervantes MD [facility]         Requested Prescriptions   Pending Prescriptions Disp Refills    amLODIPine 5 MG Oral Tab 30 tablet 0     Sig: Take 1 tablet (5 mg total) by mouth daily.       Hypertension Medications Protocol Failed - 8/30/2024 12:00 PM        Failed - CMP or BMP in past 12 months        Failed - EGFRCR or GFRNAA > 50     GFR Evaluation            Passed - Last BP reading less than 140/90     BP Readings from Last 1 Encounters:   07/17/24 110/56               Passed - In person appointment or virtual visit in the past 12 mos or appointment in next 3 mos     Recent Outpatient Visits              10 months ago Annual physical exam    [facility] Zeus Cervantes MD    Office Visit    1 year ago Primary adenocarcinoma of ascending colon (HCC)    [facility]     Office Visit    1 year ago Malignant neoplasm of ascending colon (HCC)    [facility]    Office Visit    1 year ago [facility]operative [facility]    [facility]    Office Visit    1 year ago Primary adenocarcinoma of ascending colon (HCC)    [facility]     Office Visit          Future Appointments         Provider Department Appt Notes    In 2 months Zeus Cervantes MD [facility] px, last px: 10/23/24, policyinformed                           Future Appointments         Provider Department Appt Notes    In 2 months Zeus Cervantes MD [facility] px, last px: 10/23/24, policyinformed          Recent Outpatient Visits              10 months ago Annual  physical exam    [facility], [facility], Zeus Gregory MD    Office Visit    1 year ago Primary adenocarcinoma of ascending colon (HCC)    [facility] DO    Office Visit    1 year ago Malignant neoplasm of ascending colon (HCC)    [facility], [facility], Maximiliano Puentes MD    Office Visit    1 year ago Preoperative examination    [facility], [facility], Zeus Gregory MD    Office Visit    1 year ago Primary adenocarcinoma of ascending colon (HCC)    [facility] peraza DO    Office Visit

## 2024-09-03 NOTE — TELEPHONE ENCOUNTER
Patient is requesting 90 day refill      Current Outpatient Medications:       valsartan 160 MG Oral Tab, Take 1 tablet (160 mg total) by mouth nightly., Disp: 30 tablet, Rfl: 0

## 2024-09-05 RX ORDER — ATORVASTATIN CALCIUM 10 MG/1
10 TABLET, FILM COATED ORAL NIGHTLY
Qty: 90 TABLET | Refills: 0 | Status: SHIPPED | OUTPATIENT
Start: 2024-09-05

## 2024-09-05 RX ORDER — METOPROLOL TARTRATE 50 MG
50 TABLET ORAL NIGHTLY
Qty: 90 TABLET | Refills: 0 | Status: SHIPPED | OUTPATIENT
Start: 2024-09-05

## 2024-09-05 RX ORDER — FENOFIBRATE 160 MG/1
160 TABLET ORAL DAILY
Qty: 90 TABLET | Refills: 0 | Status: SHIPPED | OUTPATIENT
Start: 2024-09-05

## 2024-09-05 RX ORDER — VALSARTAN 160 MG/1
160 TABLET ORAL NIGHTLY
Qty: 90 TABLET | Refills: 0 | Status: SHIPPED | OUTPATIENT
Start: 2024-09-05

## 2024-09-05 RX ORDER — AMLODIPINE BESYLATE 5 MG/1
5 TABLET ORAL DAILY
Qty: 90 TABLET | Refills: 0 | Status: SHIPPED | OUTPATIENT
Start: 2024-09-05

## 2024-09-05 NOTE — TELEPHONE ENCOUNTER
Patient would like a refill on all his medications for a years supply with refills to [facility] in [place]. Like to  medication at one time. Out of medications. Medications pended.  Please advise.    Amlodipine was sent on 9/3/2024 but would like refills on it.

## 2024-09-05 NOTE — TELEPHONE ENCOUNTER
Please disregard high priority status, as patient is asking to  medications at same time.    Please review; protocol failed - patient is requesting a year supply of refills for all medications.    **Patient just picked up a 90 day supply on 9/3/2024, but patient says he wants refills on it.    Future Appointments   Date Time Provider Department Center   11/4/2024  5:00 PM Zeus Cervantes MD [facility]     LAST OFFICE VISIT: 10/23/2023    Requested Prescriptions   Pending Prescriptions Disp Refills    valsartan 160 MG Oral Tab 90 tablet 3     Sig: Take 1 tablet (160 mg total) by mouth nightly.       Hypertension Medications Protocol Failed - 9/5/2024 11:27 AM        Failed - CMP or BMP in past 12 months        Failed - EGFRCR or GFRNAA > 50     GFR Evaluation            Passed - Last BP reading less than 140/90     BP Readings from Last 1 Encounters:   07/17/24 110/56               Passed - In person appointment or virtual visit in the past 12 mos or appointment in next 3 mos     Recent Outpatient Visits              10 months ago Annual physical exam    [facility] Zeus Cervantes MD    Office Visit    1 year ago Primary adenocarcinoma of ascending colon (HCC)    [facility]     Office Visit    1 year ago Malignant neoplasm of ascending colon (HCC)    [facility]    Office Visit    1 year ago Preoperative examination    [facility] Zeus Cervantes MD    Office Visit    1 year ago Primary adenocarcinoma of ascending colon (HCC)    [facility]     Office Visit          Future Appointments         Provider Department Appt Notes    In 2 months Zeus Cervantes MD [facility] px, last px: 10/23/24, policyinformed                       metoprolol tartrate 50 MG Oral Tab 90 tablet 3     Sig: Take 1 tablet (50 mg total) by mouth nightly.       Hypertension Medications Protocol Failed - 9/5/2024 11:27 AM        Failed - CMP or BMP in past 12 months        Failed - EGFRCR or GFRNAA > 50     GFR Evaluation            Passed - Last BP reading less than 140/90     BP Readings from Last 1 Encounters:   07/17/24 110/56               Passed - In person appointment or virtual visit in the past 12 mos or appointment in next 3 mos     Recent Outpatient Visits              10 months ago Annual physical exam    [facility] Zeus Cervantes MD    Office Visit    1 year ago Primary adenocarcinoma of ascending colon (HCC)    [facility]    Office Visit    1 year ago Malignant neoplasm of ascending colon (HCC)    [facility]    Office Visit    1 year ago Preoperative examination    [facility] Zeus Cervantes MD    Office Visit    1 year ago Primary adenocarcinoma of ascending colon (HCC)    [facility]     Office Visit          Future Appointments         Provider Department Appt Notes    In 2 months Zeus Cervantes MD [facility] px, last px: 10/23/24, policyinformed                      Fenofibrate 160 MG Oral Tab 90 tablet 3     Sig: Take 1 tablet (160 mg total) by mouth daily.       Cholesterol Medication Protocol Failed - 9/5/2024 11:27 AM        Failed - ALT < 80     Lab Results   Component Value Date    ALT 33 04/12/2023             Failed - ALT resulted within past year        Failed - Lipid panel within past 12 months     Lab Results   Component Value Date    CHOLEST 135 04/12/2023    TRIG 132 04/12/2023    HDL 40 04/12/2023    LDL 72  04/12/2023    VLDL 20 04/12/2023    NONHDLC 95 04/12/2023             Passed - In person appointment or virtual visit in the past 12 mos or appointment in next 3 mos     Recent Outpatient Visits              10 months ago Annual physical exam    [facility], Zeus Gregory MD    Office Visit    1 year ago Primary adenocarcinoma of ascending colon (HCC)    [facility]     Office Visit    1 year ago Malignant neoplasm of ascending colon (HCC)    [facility], [facility] [place] Maximiliano Arias MD    Office Visit    1 year ago Preoperative examination    [facility] [place] [facility]    Office Visit    1 year ago Primary adenocarcinoma of ascending colon (HCC)    [facility]     Office Visit          Future Appointments         Provider Department Appt Notes    In 2 months Zeus eCrvantes MD [facility] px, last px: 10/23/24, policyinformed                      atorvastatin 10 MG Oral Tab 90 tablet 3     Sig: Take 1 tablet (10 mg total) by mouth nightly.       Cholesterol Medication Protocol Failed - 9/5/2024 11:27 AM        Failed - ALT < 80     Lab Results   Component Value Date    ALT 33 04/12/2023             Failed - ALT resulted within past year        Failed - Lipid panel within past 12 months     Lab Results   Component Value Date    CHOLEST 135 04/12/2023    TRIG 132 04/12/2023    HDL 40 04/12/2023    LDL 72 04/12/2023    VLDL 20 04/12/2023    NONHDLC 95 04/12/2023             Passed - In person appointment or virtual visit in the past 12 mos or appointment in next 3 mos     Recent Outpatient Visits              10 months ago Annual physical exam    [facility] Zeus Gregory MD    Office Visit    1 year ago  Primary adenocarcinoma of ascending colon (HCC)    [facility]    Office Visit    1 year ago Malignant neoplasm of ascending colon (HCC)    [facility] Maximiliano Puentes MD    Office Visit    1 year ago [facility]    Office Visit    1 year ago Primary adenocarcinoma of ascending colon (HCC)    [facility]    Office Visit          Future Appointments         Provider Department Appt Notes    In 2 months Zeus Cervantes MD [facility] px, last px: 10/23/24, policyinformed                      amLODIPine 5 MG Oral Tab 90 tablet 2     Sig: Take 1 tablet (5 mg total) by mouth daily.       Hypertension Medications Protocol Failed - 9/5/2024 11:27 AM        Failed - CMP or BMP in past 12 months        Failed - EGFRCR or GFRNAA > 50     GFR Evaluation            Passed - Last BP reading less than 140/90     BP Readings from Last 1 Encounters:   07/17/24 110/56               Passed - In person appointment or virtual visit in the past 12 mos or appointment in next 3 mos     Recent Outpatient Visits              10 months ago Annual physical exam    [facility]    Office Visit    1 year ago Primary adenocarcinoma of ascending colon (HCC)    [facility]    Office Visit    1 year ago Malignant neoplasm of ascending colon (HCC)    [facility], Maximiliano Puentes MD    Office Visit    1 year ago Preoperative examination    [facility] Zeus Gregory MD    Office Visit    1 year ago Primary adenocarcinoma of ascending colon (HCC)    [facility]  Hematology Oncology Ter[facility] Yu, DO    Office Visit          Future Appointments         Provider Department Appt Notes    In 2 months Zeus Cervantes MD [facility], [facility] px, last px: 10/23/24, policyinformed                         Future Appointments         Provider Department Appt Notes    In 2 months Zeus Cervantes MD [facility] px, last px: 10/23/24, policyinformed          Recent Outpatient Visits              10 months ago Annual physical exam    [facility]    Office Visit    1 year ago Primary adenocarcinoma of ascending colon (HCC)    [facility] Hematology Oncology [facility] peraza,     Office Visit    1 year ago Malignant neoplasm of ascending colon (HCC)    [facility] Maximiliano Arias MD    Office Visit    1 year ago Preoperative examination    [facility] Zeus Gregory MD    Office Visit    1 year ago Primary adenocarcinoma of ascending colon (HCC)    [facility]    Office Visit

## 2024-09-06 RX ORDER — VALSARTAN 160 MG/1
160 TABLET ORAL NIGHTLY
Qty: 30 TABLET | Refills: 0 | OUTPATIENT
Start: 2024-09-06

## 2024-09-06 NOTE — TELEPHONE ENCOUNTER
Patient advised that prescriptions were refilled yesterday. Patient verbalized understanding and was able to pick them up yesterday.

## 2024-10-28 PROBLEM — C18.9 ADENOCARCINOMA, COLON (HCC): Status: RESOLVED | Noted: 2023-04-24 | Resolved: 2024-10-28

## 2024-10-28 PROBLEM — C18.2 PRIMARY ADENOCARCINOMA OF ASCENDING COLON (HCC): Status: RESOLVED | Noted: 2023-03-29 | Resolved: 2024-10-28

## 2024-10-28 PROBLEM — Z85.038 HISTORY OF COLON CANCER: Status: ACTIVE | Noted: 2024-03-29

## 2024-11-04 ENCOUNTER — OFFICE VISIT (OUTPATIENT)
Dept: INTERNAL MEDICINE CLINIC | Facility: CLINIC | Age: 64
End: 2024-11-04

## 2024-11-04 VITALS
BODY MASS INDEX: 40.77 KG/M2 | HEART RATE: 83 BPM | WEIGHT: 301 LBS | HEIGHT: 72 IN | DIASTOLIC BLOOD PRESSURE: 70 MMHG | SYSTOLIC BLOOD PRESSURE: 134 MMHG

## 2024-11-04 DIAGNOSIS — Z00.00 ANNUAL PHYSICAL EXAM: Primary | ICD-10-CM

## 2024-11-04 DIAGNOSIS — Z85.038 HISTORY OF COLON CANCER: ICD-10-CM

## 2024-11-04 DIAGNOSIS — N18.30 STAGE 3 CHRONIC KIDNEY DISEASE, UNSPECIFIED WHETHER STAGE 3A OR 3B CKD (HCC): ICD-10-CM

## 2024-11-04 DIAGNOSIS — E03.8 SUBCLINICAL HYPOTHYROIDISM: ICD-10-CM

## 2024-11-04 DIAGNOSIS — I70.0 AORTIC ATHEROSCLEROSIS (HCC): ICD-10-CM

## 2024-11-04 DIAGNOSIS — G47.33 OBSTRUCTIVE SLEEP APNEA: ICD-10-CM

## 2024-11-04 DIAGNOSIS — R73.03 PREDIABETES: ICD-10-CM

## 2024-11-04 DIAGNOSIS — E78.5 DYSLIPIDEMIA: ICD-10-CM

## 2024-11-04 DIAGNOSIS — E66.01 MORBID OBESITY (HCC): ICD-10-CM

## 2024-11-04 DIAGNOSIS — I10 ESSENTIAL HYPERTENSION: ICD-10-CM

## 2024-11-04 NOTE — PATIENT INSTRUCTIONS
Your blood pressure today was good at 134/70  Come in soon when you can for blood tests  Continue current medication  Please try to follow a healthy diet, exercise regularly and lose weight  Return visit in 6 months for a blood pressure check

## 2024-11-04 NOTE — H&P
Mushtaq Lee is a 64 year old male who presents for a complete physical exam.   HPI:   His last visit with me was for a physical October 2023.  He has been feeling well and has no particular issues to talk about today.    Past medical and past surgical histories reviewed, as outlined below.  Medications reviewed, as listed.  No medication allergies.    BP checks typically 130/80.  He tries to watch his diet and walks at work regularly.  Weight has been stable.  He wears CPAP nightly.  Next colonoscopy due July 2027.  Tdap vaccine July 2017.  Declines all other immunizations.    Wt Readings from Last 4 Encounters:   11/04/24 (!) 301 lb (136.5 kg)   07/17/24 290 lb (131.5 kg)   10/23/23 (!) 304 lb 12.8 oz (138.3 kg)   05/10/23 287 lb (130.2 kg)     Body mass index is 40.82 kg/m².     Current Outpatient Medications   Medication Sig Dispense Refill    valsartan 160 MG Oral Tab Take 1 tablet (160 mg total) by mouth nightly. 90 tablet 0    metoprolol tartrate 50 MG Oral Tab Take 1 tablet (50 mg total) by mouth nightly. 90 tablet 0    Fenofibrate 160 MG Oral Tab Take 1 tablet (160 mg total) by mouth daily. 90 tablet 0    atorvastatin 10 MG Oral Tab Take 1 tablet (10 mg total) by mouth nightly. 90 tablet 0    amLODIPine 5 MG Oral Tab Take 1 tablet (5 mg total) by mouth daily. 90 tablet 0     Allergies[1]   Past Medical History:    Aortic atherosclerosis (HCC)    CT scan 3-23    Chronic kidney disease, stage III (moderate) (HCC)    Dyslipidemia    Essential hypertension    Hearing impairment    Deaf in right ear    Morbid obesity (HCC)    Obstructive sleep apnea    On CPAP    Osteoarthritis    Prediabetes    Primary adenocarcinoma of ascending colon (HCC)    Invasive moderately differentiated adenocarcinoma with mucinous features (6.5 cm), grade 2; stage [id number]; s/p right hemicolectomy 4-23    Subclinical hypothyroidism      Past Surgical History:   Procedure Laterality Date    Colon surgery  04/24/2023    Right  hemicolectomy    Colonoscopy N/A 2023    Procedure: COLONOSCOPY;  Surgeon: Nadja Beach MD;  Location: [facility] ENDOSCOPY    Colonoscopy N/A 2024    Procedure: COLONOSCOPY;  Surgeon: Nadja Beach MD;  Location: [facility] ENDOSCOPY    Oral care        Family History   Problem Relation Age of Onset    Other (T-cell Lymphoma) Father     Diabetes Mother     Other (Lymphoma) Brother       Social History:  Social History     Socioeconomic History    Marital status:    Occupational History    Occupation:    Tobacco Use    Smoking status: Former     Current packs/day: 0.00     Average packs/day: 1 pack/day for 30.0 years (30.0 ttl pk-yrs)     Types: Cigarettes     Start date: 1982     Quit date: 2012     Years since quittin.3    Smokeless tobacco: Never   Vaping Use    Vaping status: Never Used   Substance and Sexual Activity    Alcohol use: Yes     Alcohol/week: 2.0 - 4.0 standard drinks of alcohol     Types: 2 - 4 Standard drinks or equivalent per week     Comment: 2-4 drinks 4 days weekly    Drug use: Yes     Types: Cannabis     Comment: smoking flower X1 a month     Social Drivers of Health     Financial Resource Strain: Low Risk  (2023)    Financial Resource Strain     Difficulty of Paying Living Expenses: Not hard at all     Med Affordability: No   Transportation Needs: No Transportation Needs (2023)    Transportation Needs     Lack of Transportation: No           REVIEW OF SYSTEMS:   GENERAL: No fever  LUNGS: No cough wheezing or shortness of breath  CARDIAC: No lightheadedness palpitations or chest pain  GI: No anorexia heartburn dysphagia nausea vomiting abdominal pain diarrhea constipation or rectal bleeding  : No urinary frequency dysuria or hematuria  MUSCULOSKELETAL: No leg swelling  NEURO: No headaches    EXAM:   GENERAL: Pleasant male appearing well in no distress  /70   Pulse 83   Ht 6' (1.829 m)   Wt (!) 301 lb (136.5 kg)   BMI 40.82 kg/m²   HEENT:  Anicteric, conjunctiva pink, oropharynx normal  NECK: Supple without mass or thyromegaly  NODES: No peripheral adenopathy  LUNGS: Resonant to percussion and clear to auscultation  CARDIAC: Rhythm regular S1 S2 normal without murmur or edema  ABDOMEN: Obese. Bowel sounds normal soft nontender without mass or hepatosplenomegaly  EXTREMITIES: Normal without cyanosis or clubbing  PULSES: 2+ bilateral dorsalis pedis and posterior tibial  NEURO: Reflexes 1-2+ bilaterally and symmetric     ASSESSMENT AND PLAN:   Mushtaq Lee is a 64 year old male who presents for a complete physical exam.     1. Annual physical exam  Discussed and recommended routine immunizations-annual influenza vaccine, COVID booster, Shingrix vaccine.  He declines.  Check CMP CBC glycohemoglobin lipid profile screening PSA and TSH with reflex T4.  Order sent  Continue current medication  Discussed and recommended healthy diet and regular exercise with some attempts at weight loss  Annual physical  Return visit in 6 months for blood pressure check.  - Comp Metabolic Panel (14); Future  - CBC, Platelet; No Differential; Future  - Hemoglobin A1C; Future  - Lipid Panel; Future  - PSA Total, Screen; Future  - TSH W Reflex To Free T4; Future    2. History of primary adencarcinoma of ascending colon  No recurrence.  Next colonoscopy due July 2027    3. Essential hypertension  Well-controlled.  Continue current medication    4. Prediabetes  Await labs.  Advice regarding diet, exercise and weight loss as above    5. Dyslipidemia  Await labs and continue current medication    6. Stage 3 chronic kidney disease, unspecified whether stage 3a or 3b CKD (HCC)  Await labs    7. Subclinical hypothyroidism  Await labs    8. Obstructive sleep apnea  Continue nightly CPAP    9. Morbid obesity (HCC)  Recommendations regarding diet exercise and weight loss as above    10. Aortic atherosclerosis (HCC)  Asymptomatic.  Risk factor control      Zeus Cervantes,  MD  11/4/2024  5:12 PM        [1] No Known Allergies

## 2024-11-11 ENCOUNTER — LAB ENCOUNTER (OUTPATIENT)
Dept: LAB | Age: 64
End: 2024-11-11
Attending: INTERNAL MEDICINE
Payer: COMMERCIAL

## 2024-11-11 DIAGNOSIS — Z00.00 ANNUAL PHYSICAL EXAM: ICD-10-CM

## 2024-11-11 LAB
ALBUMIN SERPL-MCNC: 4.8 G/DL (ref 3.2–4.8)
ALBUMIN/GLOB SERPL: 1.7 {RATIO} (ref 1–2)
ALP LIVER SERPL-CCNC: 69 U/L
ALT SERPL-CCNC: 32 U/L
ANION GAP SERPL CALC-SCNC: 8 MMOL/L (ref 0–18)
AST SERPL-CCNC: 22 U/L (ref ?–34)
BILIRUB SERPL-MCNC: 0.4 MG/DL (ref 0.2–1.1)
BUN BLD-MCNC: 21 MG/DL (ref 9–23)
BUN/CREAT SERPL: 18.6 (ref 10–20)
CALCIUM BLD-MCNC: 10.4 MG/DL (ref 8.7–10.4)
CHLORIDE SERPL-SCNC: 108 MMOL/L (ref 98–112)
CHOLEST SERPL-MCNC: 187 MG/DL (ref ?–200)
CO2 SERPL-SCNC: 26 MMOL/L (ref 21–32)
COMPLEXED PSA SERPL-MCNC: 0.79 NG/ML (ref ?–4)
CREAT BLD-MCNC: 1.13 MG/DL
DEPRECATED RDW RBC AUTO: 44.1 FL (ref 35.1–46.3)
EGFRCR SERPLBLD CKD-EPI 2021: 73 ML/MIN/1.73M2 (ref 60–?)
ERYTHROCYTE [DISTWIDTH] IN BLOOD BY AUTOMATED COUNT: 13.5 % (ref 11–15)
EST. AVERAGE GLUCOSE BLD GHB EST-MCNC: 128 MG/DL (ref 68–126)
FASTING PATIENT LIPID ANSWER: YES
FASTING STATUS PATIENT QL REPORTED: YES
GLOBULIN PLAS-MCNC: 2.9 G/DL (ref 2–3.5)
GLUCOSE BLD-MCNC: 110 MG/DL (ref 70–99)
HBA1C MFR BLD: 6.1 % (ref ?–5.7)
HCT VFR BLD AUTO: 41.2 %
HDLC SERPL-MCNC: 50 MG/DL (ref 40–59)
HGB BLD-MCNC: 13.6 G/DL
LDLC SERPL CALC-MCNC: 103 MG/DL (ref ?–100)
MCH RBC QN AUTO: 29.5 PG (ref 26–34)
MCHC RBC AUTO-ENTMCNC: 33 G/DL (ref 31–37)
MCV RBC AUTO: 89.4 FL
NONHDLC SERPL-MCNC: 137 MG/DL (ref ?–130)
OSMOLALITY SERPL CALC.SUM OF ELEC: 298 MOSM/KG (ref 275–295)
PLATELET # BLD AUTO: 269 10(3)UL (ref 150–450)
POTASSIUM SERPL-SCNC: 4.5 MMOL/L (ref 3.5–5.1)
PROT SERPL-MCNC: 7.7 G/DL (ref 5.7–8.2)
RBC # BLD AUTO: 4.61 X10(6)UL
SODIUM SERPL-SCNC: 142 MMOL/L (ref 136–145)
TRIGL SERPL-MCNC: 200 MG/DL (ref 30–149)
TSI SER-ACNC: 1.84 UIU/ML (ref 0.55–4.78)
VLDLC SERPL CALC-MCNC: 34 MG/DL (ref 0–30)
WBC # BLD AUTO: 6.2 X10(3) UL (ref 4–11)

## 2024-11-11 PROCEDURE — 80053 COMPREHEN METABOLIC PANEL: CPT

## 2024-11-11 PROCEDURE — 36415 COLL VENOUS BLD VENIPUNCTURE: CPT

## 2024-11-11 PROCEDURE — 85027 COMPLETE CBC AUTOMATED: CPT

## 2024-11-11 PROCEDURE — 84443 ASSAY THYROID STIM HORMONE: CPT

## 2024-11-11 PROCEDURE — 80061 LIPID PANEL: CPT

## 2024-11-11 PROCEDURE — 83036 HEMOGLOBIN GLYCOSYLATED A1C: CPT

## 2024-12-22 DIAGNOSIS — E78.5 DYSLIPIDEMIA: ICD-10-CM

## 2024-12-27 RX ORDER — VALSARTAN 160 MG/1
160 TABLET ORAL NIGHTLY
Qty: 90 TABLET | Refills: 3 | Status: SHIPPED | OUTPATIENT
Start: 2024-12-27

## 2024-12-27 RX ORDER — ATORVASTATIN CALCIUM 10 MG/1
10 TABLET, FILM COATED ORAL NIGHTLY
Qty: 90 TABLET | Refills: 3 | Status: SHIPPED | OUTPATIENT
Start: 2024-12-27

## 2024-12-27 RX ORDER — FENOFIBRATE 160 MG/1
160 TABLET ORAL DAILY
Qty: 90 TABLET | Refills: 3 | Status: SHIPPED | OUTPATIENT
Start: 2024-12-27

## 2024-12-27 RX ORDER — METOPROLOL TARTRATE 50 MG
50 TABLET ORAL NIGHTLY
Qty: 90 TABLET | Refills: 3 | Status: SHIPPED | OUTPATIENT
Start: 2024-12-27

## 2024-12-27 NOTE — TELEPHONE ENCOUNTER
Refill passes per [facility] protocol.    No future appointments.  Last office visit: 11/4/2024    Requested Prescriptions   Pending Prescriptions Disp Refills    FENOFIBRATE 160 MG Oral Tab [Pharmacy Med Name: FENOFIBRATE 160 MG TABLET] 90 tablet 0     Sig: TAKE 1 TABLET BY MOUTH EVERY DAY       Cholesterol Medication Protocol Passed - 12/27/2024 12:21 PM        Passed - ALT < 80     Lab Results   Component Value Date    ALT 32 11/11/2024             Passed - ALT resulted within past year        Passed - Lipid panel within past 12 months     Lab Results   Component Value Date    CHOLEST 187 11/11/2024    TRIG 200 (H) 11/11/2024    HDL 50 11/11/2024     (H) 11/11/2024    VLDL 34 (H) 11/11/2024    NONHDLC 137 (H) 11/11/2024             Passed - In person appointment or virtual visit in the past 12 mos or appointment in next 3 mos     Recent Outpatient Visits              1 month ago Annual physical exam    [facility] Zeus Gregory MD    Office Visit    1 year ago Annual physical exam    [facility] Zeus Gregory MD    Office Visit    1 year ago Primary adenocarcinoma of ascending colon (HCC)    [facility] WiTerrell peraza DO    Office Visit    1 year ago Malignant neoplasm of ascending colon (HCC)    [facility], [facility] Maximiliano Arias MD    Office Visit    1 year ago Preoperative examination    [facility] Zeus Gregory MD    Office Visit                        VALSARTAN 160 MG Oral Tab [Pharmacy Med Name: VALSARTAN 160 MG TABLET] 30 tablet 0     Sig: Take 1 tablet (160 mg total) by mouth nightly.       Hypertension Medications Protocol Passed - 12/27/2024 12:21 PM        Passed - CMP or BMP in past 12 months        Passed - Last BP reading less than 140/90     BP Readings from Last 1 Encounters:    11/04/24 134/70               Passed - In person appointment or virtual visit in the past 12 mos or appointment in next 3 mos     Recent Outpatient Visits              1 month ago [facility]    Office Visit    1 year ago Annual physical exam    [facility]    Office Visit    1 year ago Primary adenocarcinoma of ascending colon (HCC)    [facility] Hematology Oncology WiTerrell peraza DO    Office Visit    1 year ago Malignant neoplasm of ascending colon (HCC)    [facility], [facility], Maximiliano Puentes MD    Office Visit    1 year ago Preoperative examination    [facility]    Office Visit                      Passed - EGFRCR or GFRNAA > 50     GFR Evaluation  EGFRCR: 73 , resulted on 11/11/2024            METOPROLOL TARTRATE 50 MG Oral Tab [Pharmacy Med Name: METOPROLOL TARTRATE 50 MG TAB] 90 tablet 0     Sig: TAKE 1 TABLET BY MOUTH EVERY DAY AT NIGHT       Hypertension Medications Protocol Passed - 12/27/2024 12:21 PM        Passed - CMP or BMP in past 12 months        Passed - Last BP reading less than 140/90     BP Readings from Last 1 Encounters:   11/04/24 134/70               Passed - In person appointment or virtual visit in the past 12 mos or appointment in next 3 mos     Recent Outpatient Visits              1 month ago Annual physical exam    [facility]    Office Visit    1 year ago Annual physical exam    [facility]    Office Visit    1 year ago Primary adenocarcinoma of ascending colon (HCC)    [facility] Hematology Oncology WiTerrell peraza DO    Office Visit    1 year ago Malignant neoplasm of ascending  colon (HCC)    [facility], [facility], Maximiliano Puentes MD    Office Visit    1 year ago Preoperative examination    [facility]    [facility] Visit                      Passed - EGFRCR or GFRNAA > 50     GFR Evaluation  EGFRCR: 73 , resulted on 11/11/2024            ATORVASTATIN 10 MG Oral Tab [Pharmacy Med Name: ATORVASTATIN 10 MG TABLET] 90 tablet 0     Sig: TAKE 1 TABLET BY MOUTH EVERY DAY AT NIGHT       Cholesterol Medication Protocol Passed - 12/27/2024 12:21 PM        Passed - ALT < 80     Lab Results   Component Value Date    ALT 32 11/11/2024             Passed - ALT resulted within past year        Passed - Lipid panel within past 12 months     Lab Results   Component Value Date    CHOLEST 187 11/11/2024    TRIG 200 (H) 11/11/2024    HDL 50 11/11/2024     (H) 11/11/2024    VLDL 34 (H) 11/11/2024    NONHDLC 137 (H) 11/11/2024             Passed - In person appointment or virtual visit in the past 12 mos or appointment in next 3 mos     Recent Outpatient Visits              1 month ago [facility]    [facility]    1 year ago Annual physical exam    [facility]    Office Visit    1 year ago Primary adenocarcinoma of ascending colon (HCC)    [facility] On[facility] Terrell Yu DO    Office Visit    1 year ago Malignant neoplasm of ascending colon (HCC)    [facility], Maximiliano Puentes MD    Office Visit    1 year ago Preoperative examination    [facility]    [facility] Visit                             Recent Outpatient Visits              1 month ago Annual physical exam    [facility]    Office Visit    1 year ago Annual physical exam    [facility], [facility] Zeus Cervantes MD    Office Visit    1 year ago Primary adenocarcinoma of ascending colon (HCC)    [facility] Hematology Oncology WiTerrell peraza DO    Office Visit    1 year ago Malignant neoplasm of ascending colon (HCC)    [facility], [facility] Maximiliano Arias MD    Office Visit    1 year ago Preoperative examination    [facility], [facility] [place] Zeus Cervantes MD    Office Visit

## 2025-02-12 NOTE — TELEPHONE ENCOUNTER
Dr Cervantes's patient asking for a refill of:    Amlodipine 5mg    [facility] [address] in [place].  He declined to make an appointment to [facility] with DR Garcia, stating he just saw Dr Cervantes.    [phone]  Okay to leave a voicemail

## 2025-02-16 NOTE — TELEPHONE ENCOUNTER
Routed to Dr Garcia for advise, thanks.  Last ref 9-5-24 # 90 by Dr Cervantes.   No future appointments.    Refill Passed Per Protocol    Requested Prescriptions   Pending Prescriptions Disp Refills    amLODIPine 5 MG Oral Tab 90 tablet 0     Sig: Take 1 tablet (5 mg total) by mouth daily.       Hypertension Medications Protocol Passed - 2/15/2025 10:37 PM        Passed - CMP or BMP in past 12 months        Passed - Last BP reading less than 140/90     BP Readings from Last 1 Encounters:   11/04/24 134/70               Passed - In person appointment or virtual visit in the past 12 mos or appointment in next 3 mos     Recent Outpatient Visits              3 months ago Annual physical exam    [facility] Zeus Gregory MD    Office Visit    1 year ago Annual physical exam    [facility] [place] Michael, MD    Office Visit    1 year ago Primary adenocarcinoma of ascending colon (HCC)    [facility] Hematology Oncology WiTerrell peraza DO    Office Visit    1 year ago Malignant neoplasm of ascending colon (HCC)    [facility], Maximiliano Puentes MD    Office Visit    1 year ago Preoperative examination    [facility] [place]Selena Michael, MD    Office Visit                      Passed - EGFRCR or GFRNAA > 50     GFR Evaluation  EGFRCR: 73 , resulted on 11/11/2024          Passed - Medication is active on med list               Recent Outpatient Visits              3 months ago Annual physical exam    [facility] [place]Selena Michael, MD    Office Visit    1 year ago Annual physical exam    [facility] [place] Michael, MD    Office Visit    1 year ago Primary adenocarcinoma of ascending colon (HCC)    [facility] Hematology Oncology WiTerrell peraza DO     Office Visit    1 year ago Malignant neoplasm of ascending colon (HCC)    [facility], [facility] [place] Maximiliano Arias MD    Office Visit    1 year ago Preoperative examination    [facility], [facility] [place] Zeus Cervantes MD    Office Visit

## 2025-02-18 RX ORDER — AMLODIPINE BESYLATE 5 MG/1
5 TABLET ORAL DAILY
Qty: 90 TABLET | Refills: 0 | Status: SHIPPED | OUTPATIENT
Start: 2025-02-18

## 2025-02-18 NOTE — TELEPHONE ENCOUNTER
Please, see message  per below. He declined to make an appointment to [facility] with DR Garcia, stating he just saw Dr Cervantes.

## 2025-05-19 RX ORDER — AMLODIPINE BESYLATE 5 MG/1
5 TABLET ORAL DAILY
Qty: 30 TABLET | Refills: 0 | OUTPATIENT
Start: 2025-05-19

## 2025-05-19 NOTE — TELEPHONE ENCOUNTER
Spoke to patient (verified Name and ) and relayed provider's message below. Patient verbalized understanding. Appointment scheduled. No further questions or concerns at this time.

## 2025-05-19 NOTE — TELEPHONE ENCOUNTER
I had given him a 90-day courtesy refill already.  I will not be writing for any more medication until he comes in

## 2025-05-19 NOTE — TELEPHONE ENCOUNTER
Mycharted patient to schedule an appointment.   Sent to Patient  to call patient to schedule an appointment   Recent Visits  No visits were found meeting these conditions.  Showing recent visits within past 540 days with a meds authorizing provider and meeting all other requirements  Future Appointments  No visits were found meeting these conditions.  Showing future appointments within next 150 days with a meds authorizing provider and meeting all other requirements

## 2025-05-27 RX ORDER — AMLODIPINE BESYLATE 5 MG/1
5 TABLET ORAL DAILY
Qty: 30 TABLET | Refills: 2 | OUTPATIENT
Start: 2025-05-27

## 2025-05-29 ENCOUNTER — OFFICE VISIT (OUTPATIENT)
Dept: INTERNAL MEDICINE CLINIC | Facility: CLINIC | Age: 65
End: 2025-05-29

## 2025-05-29 VITALS
HEART RATE: 96 BPM | TEMPERATURE: 97 F | WEIGHT: 314.81 LBS | BODY MASS INDEX: 43.11 KG/M2 | HEIGHT: 71.5 IN | SYSTOLIC BLOOD PRESSURE: 136 MMHG | DIASTOLIC BLOOD PRESSURE: 74 MMHG | OXYGEN SATURATION: 95 % | RESPIRATION RATE: 18 BRPM

## 2025-05-29 DIAGNOSIS — R21 RASH: ICD-10-CM

## 2025-05-29 DIAGNOSIS — E78.5 DYSLIPIDEMIA: ICD-10-CM

## 2025-05-29 DIAGNOSIS — G47.33 OBSTRUCTIVE SLEEP APNEA: ICD-10-CM

## 2025-05-29 DIAGNOSIS — E03.8 SUBCLINICAL HYPOTHYROIDISM: ICD-10-CM

## 2025-05-29 DIAGNOSIS — R73.03 PREDIABETES: Primary | ICD-10-CM

## 2025-05-29 DIAGNOSIS — Z12.5 ENCOUNTER FOR PROSTATE CANCER SCREENING: ICD-10-CM

## 2025-05-29 DIAGNOSIS — I10 ESSENTIAL HYPERTENSION: ICD-10-CM

## 2025-05-29 DIAGNOSIS — R06.02 SOBOE (SHORTNESS OF BREATH ON EXERTION): ICD-10-CM

## 2025-05-29 DIAGNOSIS — E66.813 CLASS 3 SEVERE OBESITY WITH SERIOUS COMORBIDITY AND BODY MASS INDEX (BMI) OF 40.0 TO 44.9 IN ADULT, UNSPECIFIED OBESITY TYPE: ICD-10-CM

## 2025-05-29 PROCEDURE — 99214 OFFICE O/P EST MOD 30 MIN: CPT | Performed by: INTERNAL MEDICINE

## 2025-05-29 PROCEDURE — 3075F SYST BP GE 130 - 139MM HG: CPT | Performed by: INTERNAL MEDICINE

## 2025-05-29 PROCEDURE — 3008F BODY MASS INDEX DOCD: CPT | Performed by: INTERNAL MEDICINE

## 2025-05-29 PROCEDURE — 3078F DIAST BP <80 MM HG: CPT | Performed by: INTERNAL MEDICINE

## 2025-05-29 RX ORDER — ATORVASTATIN CALCIUM 10 MG/1
10 TABLET, FILM COATED ORAL NIGHTLY
Qty: 90 TABLET | Refills: 1 | Status: SHIPPED | OUTPATIENT
Start: 2025-05-29

## 2025-05-29 NOTE — PATIENT INSTRUCTIONS
No medication changes today.     If the rash returns or worsens, please let me know.    Please follow-up with me in 6 months for your annual physical.    2 to 3 days prior to that appointment, please have fasting labs done.  The orders have already been entered for you.

## 2025-05-29 NOTE — ASSESSMENT & PLAN NOTE
Continue fenofibrate and atorvastatin as above.  Recheck cholesterol panel prior to next visit.  Orders:    atorvastatin 10 MG Oral Tab; Take 1 tablet (10 mg total) by mouth nightly.    CBC With Differential With Platelet; Future    Comp Metabolic Panel (14); Future    Lipid Panel; Future    TSH W Reflex To Free T4; Future    Hemoglobin A1C; Future    Vitamin D; Future    PSA Total, Screen; Future

## 2025-05-29 NOTE — ASSESSMENT & PLAN NOTE
Continue using CPAP machine.  Orders:    CBC With Differential With Platelet; Future    Comp Metabolic Panel (14); Future    Lipid Panel; Future    TSH W Reflex To Free T4; Future    Hemoglobin A1C; Future    Vitamin D; Future    PSA Total, Screen; Future

## 2025-05-29 NOTE — ASSESSMENT & PLAN NOTE
Yearly TSH.  Orders:    CBC With Differential With Platelet; Future    Comp Metabolic Panel (14); Future    Lipid Panel; Future    TSH W Reflex To Free T4; Future    Hemoglobin A1C; Future    Vitamin D; Future    PSA Total, Screen; Future

## 2025-05-29 NOTE — ASSESSMENT & PLAN NOTE
Continue medications as above.  Recheck CMP to ensure stability of kidney function and electrolytes prior to next visit.  Orders:    CBC With Differential With Platelet; Future    Comp Metabolic Panel (14); Future    Lipid Panel; Future    TSH W Reflex To Free T4; Future    Hemoglobin A1C; Future    Vitamin D; Future    PSA Total, Screen; Future

## 2025-05-29 NOTE — PROGRESS NOTES
Mushtaq Lee is a 64 year old male with complaints of:  Chief Complaint: Establish [facility]    HPI     Mushtaq Lee is a(n) 64 year old male with a history of HTN, obesity, RAIZA, preDM, HLD, CKD3, hypothyroidism, who presents to establish [facility].    HTN. Patient continues on amlodipine 5 mg daily, valsartan 160 mg daily, metoprolol 50 mg daily. BP today 136/74.     HLD.  Patient continues on atorvastatin 10 mg daily and fenofibrate 160 mg daily.     PreDM. Last A1C 6.1 11/2024, not on meds.     CKD3. Cr baseline appears ~1.1-1.3.     Hypothyroidism. Subclinical. TSH 11/2024 WNL.     RAIZA.  Does use a CPAP machine.     Obesity. Will be planning on increasing exercise over the next few months. Will think about being on a GLP-1 agonist in the future.    Patient states that he has been having some shortness of breath on exertion, usually apparent after he walks about a mile.  He has noticed that this shortness of breath on exertion started after he gained 10 pounds over the winter.  Otherwise, he has no chest tightness, chest pain, or chest pressure.  He reportedly had a stress test 10 years ago, which was normal.    Patient has been doing [employer] (veg). Started to have a rash on his wrists after he ate vegetarian tempura. He stopped the [employer] meals. He then started eating a lot of cashews. Rash worsened with the cashews.     Past Medical History     Past Medical History[1]     Past Surgical History     Past Surgical History[2]     Family History     Family History[3]    Social History     Short Social Hx on File[4]    Allergies     Allergies[5]    Current Medications     Current Outpatient Medications   Medication Sig Dispense Refill    amLODIPine 5 MG Oral Tab Take 1 tablet (5 mg total) by mouth daily. 7 tablet 0    valsartan 160 MG Oral Tab Take 1 tablet (160 mg total) by mouth nightly. 7 tablet 0    Fenofibrate 160 MG Oral Tab Take 1 tablet (160 mg total) by mouth daily. 90 tablet 3     metoprolol tartrate 50 MG Oral Tab Take 1 tablet (50 mg total) by mouth nightly. 90 tablet 3    atorvastatin 10 MG Oral Tab Take 1 tablet (10 mg total) by mouth nightly. 90 tablet 3     No current facility-administered medications for this visit.       Review of Systems     GENERAL HEALTH: feels well otherwise  SKIN: denies any unusual skin lesions or rashes  RESPIRATORY: denies shortness of breath with exertion  CARDIOVASCULAR: denies chest pain on exertion  GI: denies abdominal pain and denies heartburn  : denies any burning with urination, urinary frequency or urgency  NEURO: denies headaches, numbness or tingling, mental status changes  PSYCH: denies depressed mood, anxiety  MUSC: denies muscle aches, joint pain    Physical Exam     /74 (BP Location: Left arm, Patient Position: Sitting, Cuff Size: large)   Pulse 96   Temp 97.4 °F (36.3 °C) (Temporal)   Resp 18   Ht 5' 11.5\" (1.816 m)   Wt (!) 314 lb 12.8 oz (142.8 kg)   SpO2 95%   BMI 43.29 kg/m²     GENERAL: well developed, well nourished,in no apparent distress  SKIN: no rashes,no suspicious lesions  HEENT: atraumatic, normocephalic,ears and throat are clear  NECK: supple,no adenopathy,no bruits  LUNGS: clear to auscultation  CARDIO: RRR without murmur  GI: good BS's,no masses, HSM or tenderness  EXTREMITIES: no cyanosis, clubbing or edema    Assessment and Plan     Assessment & Plan  Dyslipidemia  Continue fenofibrate and atorvastatin as above.  Recheck cholesterol panel prior to next visit.  Orders:    atorvastatin 10 MG Oral Tab; Take 1 tablet (10 mg total) by mouth nightly.    CBC With Differential With Platelet; Future    Comp Metabolic Panel (14); Future    Lipid Panel; Future    TSH W Reflex To Free T4; Future    Hemoglobin A1C; Future    Vitamin D; Future    PSA Total, Screen; Future    Prediabetes  Recheck A1c prior to next visit.  Orders:    CBC With Differential With Platelet; Future    Comp Metabolic Panel (14); Future    Lipid  Panel; Future    TSH W Reflex To Free T4; Future    Hemoglobin A1C; Future    Vitamin D; Future    PSA Total, Screen; Future    Essential hypertension  Continue medications as above.  Recheck CMP to ensure stability of kidney function and electrolytes prior to next visit.  Orders:    CBC With Differential With Platelet; Future    Comp Metabolic Panel (14); Future    Lipid Panel; Future    TSH W Reflex To Free T4; Future    Hemoglobin A1C; Future    Vitamin D; Future    PSA Total, Screen; Future    Subclinical hypothyroidism  Yearly TSH.  Orders:    CBC With Differential With Platelet; Future    Comp Metabolic Panel (14); Future    Lipid Panel; Future    TSH W Reflex To Free T4; Future    Hemoglobin A1C; Future    Vitamin D; Future    PSA Total, Screen; Future    Obstructive sleep apnea  Continue using CPAP machine.  Orders:    CBC With Differential With Platelet; Future    Comp Metabolic Panel (14); Future    Lipid Panel; Future    TSH W Reflex To Free T4; Future    Hemoglobin A1C; Future    Vitamin D; Future    PSA Total, Screen; Future    Class 3 severe obesity with serious comorbidity and body mass index (BMI) of 40.0 to 44.9 in adult, unspecified obesity type  Patient is a reasonable candidate for GLP-1 agonist therapy.  He states that he will think about it.  Will discuss at next visit.  Orders:    CBC With Differential With Platelet; Future    Comp Metabolic Panel (14); Future    Lipid Panel; Future    TSH W Reflex To Free T4; Future    Hemoglobin A1C; Future    Vitamin D; Future    PSA Total, Screen; Future    SOBOE (shortness of breath on exertion)  Potentially could be an anginal equivalent, but patient does state that the onset of the shortness of breath on exertion is very closely correlated to his increase in weight.  He is able to walk 1 mile before he has any shortness of breath.  Otherwise, he has no chest pain, chest pressure, or chest tightness.  He will try to increase his physical activity, and  reduce the weight, and observe his symptoms.  However, if his symptoms do not kayli with weight loss, or if they continue/worsen, we will consider stress testing in the future.  Reportedly did have a normal stress test 10 years ago.  Orders:    CBC With Differential With Platelet; Future    Comp Metabolic Panel (14); Future    Lipid Panel; Future    TSH W Reflex To Free T4; Future    Hemoglobin A1C; Future    Vitamin D; Future    PSA Total, Screen; Future    Rash  Patient has a maculopapular rash on the anterior surface of his forearms, now just looks like hypopigmented spots, potentially a food allergy?  Patient preferred to defer allergy testing for now.  He denies any kind of swelling of the tongue, swelling of the lips, difficulty breathing when eating cashews.  Did discuss that tree nut allergies could be potentially life-threatening, and if he has any of the above, he should go to the ER, but the rash that he has on his forearms is a very atypical presentation of a food allergy.  Therefore, I am comfortable deferring allergy testing for now as well.  If the rash comes back, or if he notices any other association with food, he will let me know, and can consider an allergy referral.  Orders:    CBC With Differential With Platelet; Future    Comp Metabolic Panel (14); Future    Lipid Panel; Future    TSH W Reflex To Free T4; Future    Hemoglobin A1C; Future    Vitamin D; Future    PSA Total, Screen; Future    Encounter for prostate cancer screening  PSA ordered in anticipation of his visit in November.  Orders:    PSA Total, Screen; Future         Current Medications[6]    Requested Prescriptions     Signed Prescriptions Disp Refills    atorvastatin 10 MG Oral Tab 90 tablet 1     Sig: Take 1 tablet (10 mg total) by mouth nightly.       No orders of the defined types were placed in this encounter.      No follow-ups on file.    The patient indicates understanding of these issues and agrees to the  plan.    Electronically signed by Karely Garcia MD 25             [1]   Past Medical History:   Aortic atherosclerosis    CT scan 3-    Chronic kidney disease, stage III (moderate) (HCC)    Dyslipidemia    Essential hypertension    Hearing impairment    Deaf in right ear    Morbid obesity (HCC)    Obstructive sleep apnea    On CPAP    Osteoarthritis    Prediabetes    Primary adenocarcinoma of ascending colon (HCC)    Invasive moderately differentiated adenocarcinoma with mucinous features (6.5 cm), grade 2; stage [id number]; s/p right hemicolectomy -    Subclinical hypothyroidism   [2]   Past Surgical History:  Procedure Laterality Date    Colon surgery  2023    Right hemicolectomy    Colonoscopy N/A 2023    Procedure: COLONOSCOPY;  Surgeon: Nadja Beach MD;  Location: [facility] ENDOSCOPY    Colonoscopy N/A 2024    Procedure: COLONOSCOPY;  Surgeon: Nadja Beach MD;  Location: [facility] ENDOSCOPY    Oral care     [3]   Family History  Problem Relation Age of Onset    Other (T-cell Lymphoma) Father     Diabetes Mother     Other (Lymphoma) Brother    [4]   Social History  Socioeconomic History    Marital status:    Occupational History    Occupation:    Tobacco Use    Smoking status: Former     Current packs/day: 0.00     Average packs/day: 1 pack/day for 30.0 years (30.0 ttl pk-yrs)     Types: Cigarettes     Start date: 1982     Quit date: 2012     Years since quittin.8    Smokeless tobacco: Never   Vaping Use    Vaping status: Never Used   Substance and Sexual Activity    Alcohol use: Yes     Alcohol/week: 2.0 - 4.0 standard drinks of alcohol     Types: 2 - 4 Standard drinks or equivalent per week     Comment: 2-4 drinks 4 days weekly    Drug use: Yes     Types: Cannabis     Comment: smoking flower X1 a month    Sexual activity: Yes     Partners: Female     Social Drivers of Health     Food Insecurity: No Food Insecurity (2025)    NCSS - Food Insecurity     Worried  About Running Out of Food in the Last Year: No     Ran Out of Food in the Last Year: No   Transportation Needs: No Transportation Needs (5/29/2025)    NCSS - Transportation     Lack of Transportation: No   Housing Stability: Not At Risk (5/29/2025)    NCSS - Housing/Utilities     Has Housing: Yes     Worried About Losing Housing: No     Unable to Get Utilities: No   [5] No Known Allergies  [6]    atorvastatin 10 MG Oral Tab Take 1 tablet (10 mg total) by mouth nightly. 90 tablet 1    amLODIPine 5 MG Oral Tab Take 1 tablet (5 mg total) by mouth daily. 7 tablet 0    valsartan 160 MG Oral Tab Take 1 tablet (160 mg total) by mouth nightly. 7 tablet 0    Fenofibrate 160 MG Oral Tab Take 1 tablet (160 mg total) by mouth daily. 90 tablet 3    metoprolol tartrate 50 MG Oral Tab Take 1 tablet (50 mg total) by mouth nightly. 90 tablet 3

## 2025-05-29 NOTE — ASSESSMENT & PLAN NOTE
Patient is a reasonable candidate for GLP-1 agonist therapy.  He states that he will think about it.  Will discuss at next visit.  Orders:    CBC With Differential With Platelet; Future    Comp Metabolic Panel (14); Future    Lipid Panel; Future    TSH W Reflex To Free T4; Future    Hemoglobin A1C; Future    Vitamin D; Future    PSA Total, Screen; Future

## 2025-05-29 NOTE — ASSESSMENT & PLAN NOTE
Recheck A1c prior to next visit.  Orders:    CBC With Differential With Platelet; Future    Comp Metabolic Panel (14); Future    Lipid Panel; Future    TSH W Reflex To Free T4; Future    Hemoglobin A1C; Future    Vitamin D; Future    PSA Total, Screen; Future

## 2025-05-30 RX ORDER — AMLODIPINE BESYLATE 5 MG/1
5 TABLET ORAL DAILY
Qty: 90 TABLET | Refills: 1 | Status: SHIPPED | OUTPATIENT
Start: 2025-05-30

## 2025-05-30 NOTE — TELEPHONE ENCOUNTER
Patient called, verified Name and . States he was seen in the office yesterday to establish care. He made a mistake of stating that he is out of atorvastatin when it should have been amlodipine. States he was only given a short fill of the medication until his appointment with a new PCP yesterday.  Requesting for a 6-month supply only so he will know that he will be due for followup once he is almost out of medication.    Dr. Garcia please see pended prescription for review and approval.

## 2025-07-14 NOTE — PATIENT INSTRUCTIONS
You received a Tdap vaccine today, good for 10 years. You also should get a flu shot every fall. Please obtain blood tests soon when you can. Please schedule an appointment with GI to arrange for a colonoscopy. Please continue your current medications. 0.03

## (undated) DEVICE — PERMANENT CAUTERY HOOK: Brand: ENDOWRIST

## (undated) DEVICE — SOL  0.9% 1000ML

## (undated) DEVICE — FENESTRATED BIPOLAR FORCEPS: Brand: ENDOWRIST

## (undated) DEVICE — Device: Brand: DUAL NARE NASAL CANNULAE FEMALE LUER CON 7FT O2 TUBE

## (undated) DEVICE — GAMMEX® PI HYBRID SIZE 7.5, STERILE POWDER-FREE SURGICAL GLOVE, POLYISOPRENE AND NEOPRENE BLEND: Brand: GAMMEX

## (undated) DEVICE — TOWEL SURG OR 17X30IN BLUE

## (undated) DEVICE — GIJAW SINGLE-USE BIOPSY FORCEPS WITH NEEDLE: Brand: GIJAW

## (undated) DEVICE — VESSEL SEALER EXTEND: Brand: ENDOWRIST

## (undated) DEVICE — SUT SILK 2-0 SH K833H

## (undated) DEVICE — DERMABOND CLOSURE 0.7ML TOPICL

## (undated) DEVICE — REDUCER: Brand: ENDOWRIST

## (undated) DEVICE — SUT PROLENE 4-0 RB-1 8557H

## (undated) DEVICE — INSUFFLATION NEEDLE TO ESTABLISH PNEUMOPERITONEUM.: Brand: INSUFFLATION NEEDLE

## (undated) DEVICE — KIT ENDO ORCAPOD 160/180/190

## (undated) DEVICE — DRAPE SHEET LAPCHOLE 124X100X7

## (undated) DEVICE — Device: Brand: SPOT EX ENDOSCOPIC TATTOO

## (undated) DEVICE — SNARE 9MM 230CM 2.4MM EXACTO

## (undated) DEVICE — NON-ADHERENT PADS PREPACK: Brand: TELFA

## (undated) DEVICE — TUBING MEGADYNE LAPAROSCOPIC

## (undated) DEVICE — SUTURE VLOC 90 2-0 9" 2145

## (undated) DEVICE — NEEDLE CONTRAST INTERJECT 25G

## (undated) DEVICE — SUT VICRYL 0 J906G

## (undated) DEVICE — KIT CLEAN ENDOKIT 1.1OZ GOWNX2

## (undated) DEVICE — SYSTEM SURGYPAD VELCRO 36IN

## (undated) DEVICE — ARM DRAPE

## (undated) DEVICE — CO2 CANNULA,SSOFT,ADLT,7O2,4CO2,FEMALE: Brand: MEDLINE

## (undated) DEVICE — VIOLET BRAIDED (POLYGLACTIN 910), SYNTHETIC ABSORBABLE SUTURE: Brand: COATED VICRYL

## (undated) DEVICE — GAMMEX® PI HYBRID SIZE 7, STERILE POWDER-FREE SURGICAL GLOVE, POLYISOPRENE AND NEOPRENE BLEND: Brand: GAMMEX

## (undated) DEVICE — STAPLER 60 RELOAD WHITE: Brand: SUREFORM

## (undated) DEVICE — COLUMN DRAPE

## (undated) DEVICE — 60 ML SYRINGE REGULAR TIP: Brand: MONOJECT

## (undated) DEVICE — SUT VICRYL 0 CT-1 J340H

## (undated) DEVICE — SOL NACL IRRIG 0.9% 1000ML BTL

## (undated) DEVICE — FORCEPS JUMBO ESCP 240CM 2.8

## (undated) DEVICE — SUT ETHILON 3-0 PS-2 1669H

## (undated) DEVICE — WOUND RETRACTOR AND PROTECTOR: Brand: ALEXIS O WOUND PROTECTOR-RETRACTOR

## (undated) DEVICE — APPLICATOR CHLORAPREP 26ML

## (undated) DEVICE — SEAL

## (undated) DEVICE — ROBOTIC: Brand: MEDLINE INDUSTRIES, INC.

## (undated) DEVICE — TROCAR: Brand: KII FIOS FIRST ENTRY

## (undated) DEVICE — TRAY SURESTEP 16 BARDEX DRAIN

## (undated) DEVICE — BLADELESS OBTURATOR: Brand: WECK VISTA

## (undated) DEVICE — 3M™ IOBAN™ 2 ANTIMICROBIAL INCISE DRAPE 6650EZ: Brand: IOBAN™ 2

## (undated) DEVICE — SNARE OPTMZ PLPCTM TRP

## (undated) DEVICE — MEDI-VAC NON-CONDUCTIVE SUCTION TUBING 6MM X 1.8M (6FT.) L: Brand: CARDINAL HEALTH

## (undated) DEVICE — SUT SILK 2-0 SA85H

## (undated) DEVICE — STAPLER 60: Brand: SUREFORM

## (undated) DEVICE — UNDYED BRAIDED (POLYGLACTIN 910), SYNTHETIC ABSORBABLE SUTURE: Brand: COATED VICRYL

## (undated) DEVICE — SUT ETHILON 3-0 FS-1 669H

## (undated) DEVICE — PROXIMATE SKIN STAPLERS (35 WIDE) CONTAINS 35 STAINLESS STEEL STAPLES (FIXED HEAD): Brand: PROXIMATE

## (undated) DEVICE — CLIP HEMOLOK LARGE PURPLE

## (undated) DEVICE — CANNULA SEAL

## (undated) DEVICE — STAPLER 60 RELOAD BLUE: Brand: SUREFORM

## (undated) DEVICE — TIP COVER ACCESSORY

## (undated) DEVICE — SYRINGE, LUER SLIP, STERILE, 60ML: Brand: MEDLINE

## (undated) DEVICE — LAPAROVUE VISIBILITY SYSTEM LAPAROSCOPIC SOLUTIONS: Brand: LAPAROVUE

## (undated) DEVICE — ELECTRO LUBE IS A SINGLE PATIENT USE DEVICE THAT IS INTENDED TO BE USED ON ELECTROSURGICAL ELECTRODES TO REDUCE STICKING.: Brand: KEY SURGICAL ELECTRO LUBE

## (undated) NOTE — Clinical Note
TCM call completed. A TE was sent to the office for an appointment request. The patient reports doing well at home. Thank you.

## (undated) NOTE — LETTER
[facility]  [address], [place], [place]    Authorization for Surgical Operation and Procedure                               I hereby authorize Nadja Beach MD, my physician and his/her assistants (if applicable), which may include medical students, residents, and/or fellows, to perform the following surgical operation/ procedure and administer such anesthesia as may be determined necessary by my physician: Operation/Procedure name (s) COLONOSCOPY on Mushtaq Lee   2.   I recognize that during the surgical operation/procedure, unforeseen conditions may necessitate additional or different procedures than those listed above.  I, therefore, further authorize and request that the above-named surgeon, assistants, or designees perform such procedures as are, in their judgment, necessary and desirable.    3.   My surgeon/physician has discussed prior to my surgery the potential benefits, risks and side effects of this procedure; the likelihood of achieving goals; and potential problems that might occur during recuperation.  They also discussed reasonable alternatives to the procedure, including risks, benefits, and side effects related to the alternatives and risks related to not receiving this procedure.  I have had all my questions answered and I acknowledge that no guarantee has been made as to the result that may be obtained.    4.   Should the need arise during my operation/procedure, which includes change of level of care prior to discharge, I also consent to the administration of blood and/or blood products.  Further, I understand that despite careful testing and screening of blood or blood products by collecting agencies, I may still be subject to ill effects as a result of receiving a blood transfusion and/or blood products.  The following are some, but not all, of the potential risks that can occur: fever and allergic reactions, hemolytic reactions, transmission of diseases such as  Hepatitis, AIDS and Cytomegalovirus (CMV) and fluid overload.  In the event that I wish to have an autologous transfusion of my own blood, or a directed donor transfusion, I will discuss this with my physician.  Check only if Refusing Blood or Blood Products  I understand refusal of blood or blood products as deemed necessary by my physician may have serious consequences to my condition to include possible death. I hereby assume responsibility for my refusal and release the hospital, its personnel, and my physicians from any responsibility for the consequences of my refusal.    o  Refuse   5.   I authorize the use of any specimen, organs, tissues, body parts or foreign objects that may be removed from my body during the operation/procedure for diagnosis, research or teaching purposes and their subsequent disposal by hospital authorities.  I also authorize the release of specimen test results and/or written reports to my treating physician on the hospital medical staff or other referring or consulting physicians involved in my care, at the discretion of the Pathologist or my treating physician.    6.   I consent to the photographing or videotaping of the operations or procedures to be performed, including appropriate portions of my body for medical, scientific, or educational purposes, provided my identity is not revealed by the pictures or by descriptive texts accompanying them.  If the procedure has been photographed/videotaped, the surgeon will obtain the original picture, image, videotape or CD.  The hospital will not be responsible for storage, release or maintenance of the picture, image, tape or CD.    7.   I consent to the presence of a  or observers in the operating room as deemed necessary by my physician or their designees.    8.   I recognize that in the event my procedure results in extended X-Ray/fluoroscopy time, I may develop a skin reaction.    9. If I have a Do Not Attempt  Resuscitation (DNAR) order in place, that status will be suspended while in the operating room, procedural suite, and during the recovery period unless otherwise explicitly stated by me (or a person authorized to consent on my behalf). The surgeon or my attending physician will determine when the applicable recovery period ends for purposes of reinstating the DNAR order.  10. Patients having a sterilization procedure: I understand that if the procedure is successful the results will be permanent and it will therefore be impossible for me to inseminate, conceive, or bear children.  I also understand that the procedure is intended to result in sterility, although the result has not been guaranteed.   11. I acknowledge that my physician has explained sedation/analgesia administration to me including the risk and benefits I consent to the administration of sedation/analgesia as may be necessary or desirable in the judgment of my physician.    I CERTIFY THAT I HAVE READ AND FULLY UNDERSTAND THE ABOVE CONSENT TO OPERATION and/or OTHER PROCEDURE.     ____________________________________  _________________________________        ______________________________  Signature of Patient    Signature of Responsible Person                Printed Name of Responsible Person                                      ____________________________________  _____________________________                ________________________________  Signature of Witness        Date  Time         Relationship to Patient    STATEMENT OF PHYSICIAN My signature below affirms that prior to the time of the procedure; I have explained to the patient and/or his/her legal representative, the risks and benefits involved in the proposed treatment and any reasonable alternative to the proposed treatment. I have also explained the risks and benefits involved in refusal of the proposed treatment and alternatives to the proposed treatment and have answered the patient's  questions. If I have a significant financial interest in a co-management agreement or a significant financial interest in any product or implant, or other significant relationship used in this procedure/surgery, I have disclosed this and had a discussion with my patient.     _____________________________________________________              _____________________________  (Signature of Physician)                                                                                         (Date)                                   (Time)  Patient Name: Mushtaq Lee      : 1960      Printed: 7/15/2024     Medical Record #: [id number]                                      Page 1 of 1

## (undated) NOTE — LETTER
[address]  Authorization for Surgical Operation and Procedure                                                                                           1. I hereby authorize John Ruiz MD, my physician and his/her assistants (if applicable), which may include medical students, residents, and/or fellows, to perform the following surgical operation/ procedure and administer such anesthesia as may be determined necessary by my physician: Operation/Procedure name (s) COLONOSCOPY on Moira Clark   2. I recognize that during the surgical operation/procedure, unforeseen conditions may necessitate additional or different procedures than those listed above. I, therefore, further authorize and request that the above-named surgeon, assistants, or designees perform such procedures as are, in their judgment, necessary and desirable. 3.   My surgeon/physician has discussed prior to my surgery the potential benefits, risks and side effects of this procedure; the likelihood of achieving goals; and potential problems that might occur during recuperation. They also discussed reasonable alternatives to the procedure, including risks, benefits, and side effects related to the alternatives and risks related to not receiving this procedure. I have had all my questions answered and I acknowledge that no guarantee has been made as to the result that may be obtained. 4.   Should the need arise during my operation/procedure, which includes change of level of care prior to discharge, I also consent to the administration of blood and/or blood products. Further, I understand that despite careful testing and screening of blood or blood products by collecting agencies, I may still be subject to ill effects as a result of receiving a blood transfusion and/or blood products.   The following are some, but not all, of the potential risks that can occur: fever and allergic reactions, hemolytic reactions, transmission of diseases such as Hepatitis, AIDS and Cytomegalovirus (CMV) and fluid overload. In the event that I wish to have an autologous transfusion of my own blood, or a directed donor transfusion, I will discuss this with my physician. Check only if Refusing Blood or Blood Products  I understand refusal of blood or blood products as deemed necessary by my physician may have serious consequences to my condition to include possible death. I hereby assume responsibility for my refusal and release the hospital, its personnel, and my physicians from any responsibility for the consequences of my refusal.    o  Refuse   5. I authorize the use of any specimen, organs, tissues, body parts or foreign objects that may be removed from my body during the operation/procedure for diagnosis, research or teaching purposes and their subsequent disposal by hospital authorities. I also authorize the release of specimen test results and/or written reports to my treating physician on the hospital medical staff or other referring or consulting physicians involved in my care, at the discretion of the Pathologist or my treating physician. 6.   I consent to the photographing or videotaping of the operations or procedures to be performed, including appropriate portions of my body for medical, scientific, or educational purposes, provided my identity is not revealed by the pictures or by descriptive texts accompanying them. If the procedure has been photographed/videotaped, the surgeon will obtain the original picture, image, videotape or CD. The hospital will not be responsible for storage, release or maintenance of the picture, image, tape or CD.    7.   I consent to the presence of a  or observers in the operating room as deemed necessary by my physician or their designees.     8.   I recognize that in the event my procedure results in extended X-Ray/fluoroscopy time, I may develop a skin reaction. 9. If I have a Do Not Attempt Resuscitation (DNAR) order in place, that status will be suspended while in the operating room, procedural suite, and during the recovery period unless otherwise explicitly stated by me (or a person authorized to consent on my behalf). The surgeon or my attending physician will determine when the applicable recovery period ends for purposes of reinstating the DNAR order. 10. Patients having a sterilization procedure: I understand that if the procedure is successful the results will be permanent and it will therefore be impossible for me to inseminate, conceive, or bear children. I also understand that the procedure is intended to result in sterility, although the result has not been guaranteed. 11. I acknowledge that my physician has explained sedation/analgesia administration to me including the risk and benefits I consent to the administration of sedation/analgesia as may be necessary or desirable in the judgment of my physician. I CERTIFY THAT I HAVE READ AND FULLY UNDERSTAND THE ABOVE CONSENT TO OPERATION and/or OTHER PROCEDURE.     _________________________________________ _________________________________     ___________________________________  Signature of Patient     Signature of Responsible Person                   Printed Name of Responsible Person                              _________________________________________ ______________________________        ___________________________________  Signature of Witness         Date  Time         Relationship to Patient    STATEMENT OF PHYSICIAN My signature below affirms that prior to the time of the procedure; I have explained to the patient and/or his/her legal representative, the risks and benefits involved in the proposed treatment and any reasonable alternative to the proposed treatment.  I have also explained the risks and benefits involved in refusal of the proposed treatment and alternatives to the proposed treatment and have answered the patient's questions.  If I have a significant financial interest in a co-management agreement or a significant financial interest in any product or implant, or other significant relationship used in this procedure/surgery, I have disclosed this and had a discussion with my patient.     _______________________________________________________________ _____________________________  Wanda Bustillo of Physician)                                                                                         (Date)                                   (Time)  Patient Name: Shraddha Munoz    : 1960   Printed: 3/21/2023      Medical Record #: [id number]                                              Page 1 of 1

## (undated) NOTE — LETTER
1/4/2024    Mushtaq Lee        [address]        [facility] [address]            Dear Mushtaq Lee,      Our records indicate that you are due for an appointment for a Colonoscopy with Nadja Beach MD. Our doctors are booking out about 3-6 months in advance for procedures.     Please call our office to schedule a phone screening appointment to plan for the procedure(s).   Your medical well-being is important to us.    If your insurance requires a referral, please call your primary care office to request one.      Thank you,      The Physicians and Staff at [facility]

## (undated) NOTE — LETTER
[facility]  Administration of Anesthesia  I, Mushtaq Lee agree to be cared for by a physician anesthesiologist alone and/or with a nurse anesthetist, who is specially trained to monitor me and give me medicine to put me to sleep or keep me comfortable during my procedure    I understand that my anesthesiologist and/or anesthetist is not an employee or agent of [facility] or [employer]. He or she works for [facility]    As the patient asking for anesthesia services, I agree to:  Allow the anesthesiologist (anesthesia doctor) to give me medicine and do additional procedures as necessary. Some examples are: Starting or using an “IV” to give me medicine, fluids or blood during my procedure, and having a breathing tube placed to help me breathe when I’m asleep (intubation). In the event that my heart stops working properly, I understand that my anesthesiologist will make every effort to sustain my life, unless otherwise directed by [facility] Do Not Resuscitate documents.  Tell my anesthesia doctor before my procedure:  If I am pregnant.  The last time that I ate or drank.  iii. All of the medicines I take (including prescriptions, herbal supplements, and pills I can buy without a prescription (including street drugs/illegal medications). Failure to inform my anesthesiologist about these medicines may increase my risk of anesthetic complications.  iv.If I am allergic to anything or have had a reaction to anesthesia before.  I understand how the anesthesia medicine will help me (benefits).  I understand that with any type of anesthesia medicine there are risks:  The most common risks are: nausea, vomiting, sore throat, muscle soreness, damage to my eyes, mouth, or teeth (from breathing tube placement).  Rare risks include: remembering what happened during my procedure, allergic reactions to medications, injury to my airway, heart, lungs, vision, nerves, or  muscles and in extremely rare instances death.  My doctor has explained to me other choices available to me for my care (alternatives).  Pregnant Patients (“epidural”):  I understand that the risks of having an epidural (medicine given into my back to help control pain during labor), include itching, low blood pressure, difficulty urinating, headache or slowing of the baby’s heart. Very rare risks include infection, bleeding, seizure, irregular heart rhythms and nerve injury.  Regional Anesthesia (“spinal”, “epidural”, & “nerve blocks”):  I understand that rare but potential complications include headache, bleeding, infection, seizure, irregular heart rhythms, and nerve injury.    _____________________________________________________________________________  Patient (or Representative) Signature/Relationship to Patient  Date   Time    _____________________________________________________________________________   Name (if used)    Language/Organization   Time    _____________________________________________________________________________  Nurse Anesthetist Signature     Date   Time  _____________________________________________________________________________  Anesthesiologist Signature     Date   Time  I have discussed the procedure and information above with the patient (or patient’s representative) and answered their questions. The patient or their representative has agreed to have anesthesia services.    _____________________________________________________________________________  Witness        Date   Time  I have verified that the signature is that of the patient or patient’s representative, and that it was signed before the procedure  Patient Name: Mushtaq Lee     : 1960                 Printed: 7/15/2024 at 9:13 AM    Medical Record #: [id number]                                            Page 1 of 1  ----------ANESTHESIA CONSENT----------